# Patient Record
Sex: FEMALE | Race: WHITE | Employment: PART TIME | URBAN - METROPOLITAN AREA
[De-identification: names, ages, dates, MRNs, and addresses within clinical notes are randomized per-mention and may not be internally consistent; named-entity substitution may affect disease eponyms.]

---

## 2017-02-15 ENCOUNTER — OFFICE VISIT (OUTPATIENT)
Dept: FAMILY MEDICINE CLINIC | Age: 22
End: 2017-02-15

## 2017-02-15 VITALS
SYSTOLIC BLOOD PRESSURE: 108 MMHG | TEMPERATURE: 98.7 F | RESPIRATION RATE: 12 BRPM | WEIGHT: 117.4 LBS | DIASTOLIC BLOOD PRESSURE: 62 MMHG | HEART RATE: 72 BPM | OXYGEN SATURATION: 99 %

## 2017-02-15 DIAGNOSIS — L30.8 OTHER ECZEMA: ICD-10-CM

## 2017-02-15 DIAGNOSIS — J45.20 MILD INTERMITTENT ASTHMA WITHOUT COMPLICATION: Primary | ICD-10-CM

## 2017-02-15 DIAGNOSIS — L01.00 IMPETIGO: ICD-10-CM

## 2017-02-15 PROCEDURE — 99213 OFFICE O/P EST LOW 20 MIN: CPT | Performed by: FAMILY MEDICINE

## 2017-02-15 RX ORDER — ALBUTEROL SULFATE 90 UG/1
2 AEROSOL, METERED RESPIRATORY (INHALATION) EVERY 6 HOURS PRN
Qty: 1 INHALER | Refills: 1 | Status: SHIPPED | OUTPATIENT
Start: 2017-02-15

## 2017-02-15 RX ORDER — FLUTICASONE PROPIONATE 110 UG/1
1 AEROSOL, METERED RESPIRATORY (INHALATION) 2 TIMES DAILY
Qty: 1 INHALER | Refills: 1 | Status: CANCELLED | OUTPATIENT
Start: 2017-02-15 | End: 2018-02-15

## 2017-02-15 RX ORDER — MUPIROCIN CALCIUM 20 MG/G
CREAM TOPICAL
Qty: 1 TUBE | Refills: 0 | Status: SHIPPED | OUTPATIENT
Start: 2017-02-15 | End: 2017-03-17

## 2017-02-15 ASSESSMENT — ENCOUNTER SYMPTOMS
ABDOMINAL PAIN: 0
SHORTNESS OF BREATH: 0
COUGH: 0
WHEEZING: 0
CHEST TIGHTNESS: 0

## 2017-11-27 ENCOUNTER — HOSPITAL ENCOUNTER (OUTPATIENT)
Dept: OTHER | Age: 22
Discharge: HOME | End: 2017-11-27
Attending: FAMILY MEDICINE | Admitting: FAMILY MEDICINE

## 2017-11-27 ENCOUNTER — OFFICE VISIT (OUTPATIENT)
Dept: FAMILY MEDICINE CLINIC | Age: 22
End: 2017-11-27

## 2017-11-27 VITALS
WEIGHT: 114.4 LBS | SYSTOLIC BLOOD PRESSURE: 114 MMHG | DIASTOLIC BLOOD PRESSURE: 76 MMHG | HEART RATE: 71 BPM | TEMPERATURE: 99 F | OXYGEN SATURATION: 99 %

## 2017-11-27 DIAGNOSIS — S86.912A STRAIN OF LEFT KNEE, INITIAL ENCOUNTER: ICD-10-CM

## 2017-11-27 DIAGNOSIS — M25.562 ACUTE PAIN OF LEFT KNEE: Primary | ICD-10-CM

## 2017-11-27 DIAGNOSIS — M25.562 ACUTE PAIN OF LEFT KNEE: ICD-10-CM

## 2017-11-27 PROCEDURE — 99213 OFFICE O/P EST LOW 20 MIN: CPT | Performed by: FAMILY MEDICINE

## 2017-11-27 RX ORDER — NAPROXEN SODIUM 550 MG/1
550 TABLET ORAL 2 TIMES DAILY WITH MEALS
Qty: 30 TABLET | Refills: 0 | Status: SHIPPED | OUTPATIENT
Start: 2017-11-27 | End: 2020-03-20

## 2017-11-28 ENCOUNTER — TELEPHONE (OUTPATIENT)
Dept: FAMILY MEDICINE CLINIC | Age: 22
End: 2017-11-28

## 2017-11-28 NOTE — TELEPHONE ENCOUNTER
Spoke with pt advised per Dr Julianna Gutierrez of [place] knee xray and to follow instructions and f/u if sx persist

## 2017-11-28 NOTE — TELEPHONE ENCOUNTER
The patient called about knee xray results. Explained the result is in Dr. Rola Gardiner box ready for review. She is concerned as she still cannot straighten knee and has pain when walking.   Told her I will forward message and someone will call her when Dr. Rola Gardiner has had a chance to review

## 2017-12-13 ASSESSMENT — ENCOUNTER SYMPTOMS
SHORTNESS OF BREATH: 0
CHEST TIGHTNESS: 0
BLOOD IN STOOL: 0
COUGH: 0
ABDOMINAL PAIN: 0

## 2017-12-13 NOTE — PROGRESS NOTES
SUBJECTIVE:  Chris Kirkpatrick   1995   female   Allergies   Allergen Reactions    Milk-Related Compounds Shortness Of Breath    Other Anaphylaxis     trouble breathing and swelling    Lavender Oil        Chief Complaint   Patient presents with    Knee Pain     left knee pain x2 weeks pt states getting worse         Patient Active Problem List    Diagnosis Date Noted    Suicide attempt by multiple drug overdose ([facility] 08/11/2013    Overdose of antidepressant 08/11/2013    Asthma 08/11/2013       HPI   Pt is here today co 1-2 week hx of left knee pain. Reports she thinks she may have twisted it while walking. No swelling or redness. No previous problems with left knee. Pain can be worse with walking or fully flexing or extending the knee. No tx at home. Knee does not give way or lock. . No other concerns today. Past Medical History:   Diagnosis Date    Asthma     Bipolar disorder ([facility]     Depression      Social History     Social History    Marital status: Single     Spouse name: N/A    Number of children: N/A    Years of education: N/A     Occupational History    Not on file. Social History Main Topics    Smoking status: Never Smoker    Smokeless tobacco: Never Used    Alcohol use No    Drug use: No    Sexual activity: Not on file     Other Topics Concern    Not on file     Social History Narrative    ** Merged History Encounter **          Family History   Problem Relation Age of Onset    Cancer Mother     Heart Disease Maternal Grandmother     Cancer Maternal Grandmother        Review of Systems   Constitutional: Negative for activity change, appetite change and unexpected weight change. Respiratory: Negative for cough, chest tightness and shortness of breath. Cardiovascular: Negative for chest pain, palpitations and leg swelling. Gastrointestinal: Negative for abdominal pain and blood in stool. Endocrine: Negative for cold intolerance and heat intolerance.    Musculoskeletal: Negative for arthralgias and myalgias. Left knee pain   Skin: Negative for rash. Neurological: Negative for light-headedness and headaches. Hematological: Negative for adenopathy. Does not bruise/bleed easily. Psychiatric/Behavioral: Negative for dysphoric mood, sleep disturbance and suicidal ideas. The patient is not nervous/anxious. OBJECTIVE:  /76 (Site: Right Arm, Position: Sitting, Cuff Size: Medium Adult)   Pulse 71   Temp 99 °F (37.2 °C) (Oral)   Wt 114 lb 6.4 oz (51.9 kg)   LMP 11/21/2017   SpO2 99%   BMI 20.92 kg/m²   Physical Exam   Constitutional: She is oriented to person, place, and time. She appears well-developed and well-nourished. Eyes: EOM are normal. Pupils are equal, round, and reactive to light. Neck: Normal range of motion. Neck supple. Cardiovascular: Normal rate and regular rhythm. Pulmonary/Chest: Effort normal and breath sounds normal.   Abdominal: Soft. Bowel sounds are normal. There is no tenderness. Musculoskeletal:   Left knee-no swelling or redness of effusion. Nl rom with pain on full extension or flexion. No ligament laxity   Lymphadenopathy:     She has no cervical adenopathy. Neurological: She is alert and oriented to person, place, and time. She displays normal reflexes. No cranial nerve deficit. She exhibits normal muscle tone. Coordination normal.   Skin: No rash noted. Psychiatric: She has a normal mood and affect. Her behavior is normal.   Nursing note and vitals reviewed. ASSESSMENT/PLAN:    1. Acute pain of left knee    - XR KNEE LEFT (1-2 VIEWS); Future    2. Strain of left knee, initial encounter  Wrap, rest, ice  Anaprox DS BID with food  Re evaluate if no improvement in 2 weeks.         Orders Placed This Encounter   Procedures    XR KNEE LEFT (1-2 VIEWS)     Standing Status:   Future     Number of Occurrences:   1     Standing Expiration Date:   11/27/2018     Order Specific Question:   Reason for exam:     Answer:

## 2018-08-17 ENCOUNTER — OFFICE VISIT (OUTPATIENT)
Dept: FAMILY MEDICINE CLINIC | Age: 23
End: 2018-08-17

## 2018-08-17 VITALS
BODY MASS INDEX: 21.22 KG/M2 | WEIGHT: 116 LBS | OXYGEN SATURATION: 98 % | HEART RATE: 67 BPM | TEMPERATURE: 98.9 F | SYSTOLIC BLOOD PRESSURE: 110 MMHG | DIASTOLIC BLOOD PRESSURE: 68 MMHG
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
## 2018-08-17 DIAGNOSIS — S16.1XXA STRAIN OF NECK MUSCLE, INITIAL ENCOUNTER: ICD-10-CM

## 2018-08-17 DIAGNOSIS — M54.2 NECK PAIN: Primary | ICD-10-CM

## 2018-08-17 DIAGNOSIS — Z48.02 REMOVAL OF STAPLE: ICD-10-CM

## 2018-08-17 DIAGNOSIS — S01.01XA LACERATION OF SCALP, INITIAL ENCOUNTER: ICD-10-CM

## 2018-08-17 PROCEDURE — 99214 OFFICE O/P EST MOD 30 MIN: CPT | Performed by: FAMILY MEDICINE

## 2018-08-17 PROCEDURE — 1111F DSCHRG MED/CURRENT MED MERGE: CPT | Performed by: FAMILY MEDICINE

## 2018-08-17 ASSESSMENT — ENCOUNTER SYMPTOMS
CHEST TIGHTNESS: 0
ABDOMINAL PAIN: 0
COUGH: 0
SHORTNESS OF BREATH: 0
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
## 2018-08-17 NOTE — PATIENT INSTRUCTIONS
Patient Education        Neck Strain: Care Instructions  Your Care Instructions    You have strained the muscles and ligaments in your neck. A sudden, awkward movement can strain the neck. This often occurs with falls or car accidents or during certain sports. Everyday activities like working on a computer or sleeping can also cause neck strain if they force you to hold your neck in an awkward position for a long time. It is common for neck pain to get worse for a day or two after an injury, but it should start to feel better after that. You may have more pain and stiffness for several days before it gets better. This is expected. It may take a few weeks or longer for it to heal completely. Good home treatment can help you get better faster and avoid future neck problems. Follow-up care is a key part of your treatment and safety. Be sure to make and go to all appointments, and call your doctor if you are having problems. It's also a good idea to know your test results and keep a list of the medicines you take. How can you care for yourself at home? · If you were given a neck brace (cervical collar) to limit neck motion, wear it as instructed for as many days as your doctor tells you to. Do not wear it longer than you were told to. Wearing a brace for too long can make neck stiffness worse and weaken the neck muscles. · You can try using heat or ice to see if it helps. ¨ Try using a heating pad on a low or medium setting for 15 to 20 minutes every 2 to 3 hours. Try a warm shower in place of one session with the heating pad. You can also buy single-use heat wraps that last up to 8 hours. ¨ You can also try an ice pack for 10 to 15 minutes every 2 to 3 hours. · Take pain medicines exactly as directed. ¨ If the doctor gave you a prescription medicine for pain, take it as prescribed. ¨ If you are not taking a prescription pain medicine, ask your doctor if you can take an over-the-counter medicine.   · Gently rub

## 2019-06-18 ENCOUNTER — OFFICE VISIT (OUTPATIENT)
Dept: FAMILY MEDICINE CLINIC | Age: 24
End: 2019-06-18
Payer: COMMERCIAL

## 2019-06-18 VITALS
HEART RATE: 61 BPM | OXYGEN SATURATION: 98 % | BODY MASS INDEX: 20.19 KG/M2 | TEMPERATURE: 98.1 F | SYSTOLIC BLOOD PRESSURE: 108 MMHG | DIASTOLIC BLOOD PRESSURE: 66 MMHG | WEIGHT: 110.4 LBS

## 2019-06-18 DIAGNOSIS — R51.9 NONINTRACTABLE EPISODIC HEADACHE, UNSPECIFIED HEADACHE TYPE: ICD-10-CM

## 2019-06-18 DIAGNOSIS — S16.1XXA STRAIN OF NECK MUSCLE, INITIAL ENCOUNTER: ICD-10-CM
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
## 2019-06-18 DIAGNOSIS — M54.2 NECK PAIN: Primary | ICD-10-CM

## 2019-06-18 PROCEDURE — 99214 OFFICE O/P EST MOD 30 MIN: CPT | Performed by: FAMILY MEDICINE

## 2019-06-18 RX ORDER — AMITRIPTYLINE HYDROCHLORIDE 10 MG/1
10 TABLET, FILM COATED ORAL NIGHTLY
Qty: 30 TABLET | Refills: 0 | Status: SHIPPED | OUTPATIENT
Start: 2019-06-18 | End: 2019-08-27 | Stop reason: SDUPTHER

## 2019-06-24 ASSESSMENT — ENCOUNTER SYMPTOMS
SHORTNESS OF BREATH: 0
CHEST TIGHTNESS: 0
ABDOMINAL PAIN: 0
COUGH: 0
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
## 2019-07-17 ENCOUNTER — HOSPITAL ENCOUNTER (OUTPATIENT)
Dept: PHYSICAL THERAPY | Age: 24
Setting detail: THERAPIES SERIES
Discharge: HOME | End: 2019-07-17

## 2019-07-17 PROCEDURE — 97161 PT EVAL LOW COMPLEX 20 MIN: CPT

## 2019-07-17 PROCEDURE — 97530 THERAPEUTIC ACTIVITIES: CPT

## 2019-07-17 ASSESSMENT — PAIN DESCRIPTION - DESCRIPTORS: DESCRIPTORS: ACHING;TIGHTNESS

## 2019-07-17 ASSESSMENT — PAIN DESCRIPTION - PAIN TYPE: TYPE: ACUTE PAIN;CHRONIC PAIN

## 2019-07-17 ASSESSMENT — PAIN DESCRIPTION - LOCATION: LOCATION: NECK

## 2019-07-17 NOTE — FLOWSHEET NOTE
purpose of improving mobility and quad tone / recruitment which will allow for increased overall function including but not limited to self-care, transfers, ambulation, and ascending / descending stairs. Modalities:      Charges:  Timed Code Treatment Minutes: 23   Total Treatment Minutes: 38     [x] EVAL - LOW (32407)   [] EVAL - MOD (46980)  [] EVAL - HIGH (71983)  [] RE-EVAL (93291)  [] TE (61264) x      [] Ionto  [] NMR (75680) x      [] Vaso  [] Manual (01672) x      [] Ultrasound  [x] TA x 2      [] Mech Traction (94603)  [] Gait Training x     [] ES (un) (17095):   [] Aquatic therapy x   [] Other:   [] Group:     GOALS:   Long term goals  Time Frame for Long term goals : 10 weeks  Long term goal 1: Pt will demo 5/5 strength in B UE for improved ADLs. Long term goal 2: Pt will report pain decrease to 2/10 at worst for improved activity tolerance. Long term goal 3: Pt will return to working full duty. Long term goal 4: Pt will be report scar is less sensitive to touch and that she is able to sleep on her back without scar discomfort. Long term goal 5: Pt will be independent with HEP for improved long term health. Progression Towards Functional goals:  [] Patient is progressing as expected towards functional goals listed. [] Progression is slowed due to complexities listed. [] Progression has been slowed due to co-morbidities.   [x] Plan just implemented, too soon to assess goals progression  [] Other:     Persisting Functional Limitations/Impairments:  [x]Sleeping []Sitting               []Standing []Transfers        []Walking []Kneeling               []Stairs []Squatting / bending   []ADLs []Reaching  [x]Lifting  [x]Housework  []Driving [x]Job related tasks  [x]Sports/Recreation []Other:        ASSESSMENT:  See eval  Treatment/Activity Tolerance:  [x] Patient tolerated treatment well [] Patient limited by fatigue  [] Patient limited by pain  [] Patient limited by other medical complications  [] Other:     Prognosis: [x] Good [] Fair  [] Poor    Patient Requires Follow-up: [x] Yes  [] No    PLAN: See eval. PT 1x / week for 10 weeks.    [] Continue per plan of care [] Alter current plan (see comments)  [x] Plan of care initiated [] Hold pending MD visit [] Discharge    Electronically signed by: Derek Alvares PT, DPT
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
## 2019-08-27 ENCOUNTER — OFFICE VISIT (OUTPATIENT)
Dept: FAMILY MEDICINE CLINIC | Age: 24
End: 2019-08-27
Payer: COMMERCIAL

## 2019-08-27 VITALS
OXYGEN SATURATION: 98 % | TEMPERATURE: 98.5 F | RESPIRATION RATE: 16 BRPM | SYSTOLIC BLOOD PRESSURE: 118 MMHG | DIASTOLIC BLOOD PRESSURE: 70 MMHG | BODY MASS INDEX: 20.56 KG/M2 | HEART RATE: 61 BPM | WEIGHT: 112.4 LBS

## 2019-08-27 DIAGNOSIS — S16.1XXA STRAIN OF NECK MUSCLE, INITIAL ENCOUNTER: Primary | ICD-10-CM
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
## 2019-08-27 DIAGNOSIS — G44.329 CHRONIC POST-TRAUMATIC HEADACHE, NOT INTRACTABLE: ICD-10-CM

## 2019-08-27 DIAGNOSIS — M54.2 NECK PAIN: ICD-10-CM

## 2019-08-27 PROCEDURE — 99213 OFFICE O/P EST LOW 20 MIN: CPT | Performed by: FAMILY MEDICINE

## 2019-08-27 RX ORDER — AMITRIPTYLINE HYDROCHLORIDE 25 MG/1
25 TABLET, FILM COATED ORAL NIGHTLY
Qty: 30 TABLET | Refills: 1 | Status: SHIPPED | OUTPATIENT
Start: 2019-08-27 | End: 2020-03-20

## 2019-09-04 ENCOUNTER — HOSPITAL ENCOUNTER (EMERGENCY)
Age: 24
Discharge: HOME | End: 2019-09-04
Attending: EMERGENCY MEDICINE
Payer: COMMERCIAL

## 2019-09-04 VITALS
HEIGHT: 60 IN | SYSTOLIC BLOOD PRESSURE: 106 MMHG | WEIGHT: 110 LBS | DIASTOLIC BLOOD PRESSURE: 64 MMHG | BODY MASS INDEX: 21.6 KG/M2 | OXYGEN SATURATION: 99 % | TEMPERATURE: 98.3 F | HEART RATE: 65 BPM | RESPIRATION RATE: 12 BRPM

## 2019-09-04 DIAGNOSIS — R20.2 PARESTHESIA: Primary | ICD-10-CM

## 2019-09-04 LAB
A/G RATIO: 1.6 (ref 1.1–2.2)
ALBUMIN SERPL-MCNC: 4.4 G/DL (ref 3.4–5)
ALP BLD-CCNC: 46 U/L (ref 40–129)
ALT SERPL-CCNC: 7 U/L (ref 10–40)
AMYLASE: 65 U/L (ref 25–115)
ANION GAP SERPL CALCULATED.3IONS-SCNC: 10 MMOL/L (ref 3–16)
AST SERPL-CCNC: 15 U/L (ref 15–37)
BILIRUB SERPL-MCNC: 0.4 MG/DL (ref 0–1)
BUN BLDV-MCNC: 11 MG/DL (ref 7–20)
CALCIUM SERPL-MCNC: 9.3 MG/DL (ref 8.3–10.6)
CHLORIDE BLD-SCNC: 103 MMOL/L (ref 99–110)
CO2: 26 MMOL/L (ref 21–32)
CREAT SERPL-MCNC: 0.6 MG/DL (ref 0.6–1.1)
GFR AFRICAN AMERICAN: >60
GFR NON-AFRICAN AMERICAN: >60
GLOBULIN: 2.7 G/DL
GLUCOSE BLD-MCNC: 88 MG/DL (ref 70–99)
HCG QUALITATIVE: NEGATIVE
HCT VFR BLD CALC: 39.4 % (ref 36–48)
HEMOGLOBIN: 13.2 G/DL (ref 12–16)
LIPASE: 26 U/L (ref 13–60)
MAGNESIUM: 2.2 MG/DL (ref 1.8–2.4)
MCH RBC QN AUTO: 34 PG (ref 26–34)
MCHC RBC AUTO-ENTMCNC: 33.4 G/DL (ref 31–36)
MCV RBC AUTO: 101.8 FL (ref 80–100)
PDW BLD-RTO: 12.6 % (ref 12.4–15.4)
PLATELET # BLD: 272 K/UL (ref 135–450)
PMV BLD AUTO: 7.8 FL (ref 5–10.5)
POTASSIUM SERPL-SCNC: 3.8 MMOL/L (ref 3.5–5.1)
RBC # BLD: 3.87 M/UL (ref 4–5.2)
SODIUM BLD-SCNC: 139 MMOL/L (ref 136–145)
TOTAL PROTEIN: 7.1 G/DL (ref 6.4–8.2)
WBC # BLD: 5.9 K/UL (ref 4–11)

## 2019-09-04 PROCEDURE — 99283 EMERGENCY DEPT VISIT LOW MDM: CPT

## 2019-09-04 PROCEDURE — 85027 COMPLETE CBC AUTOMATED: CPT

## 2019-09-04 PROCEDURE — 83690 ASSAY OF LIPASE: CPT

## 2019-09-04 PROCEDURE — 84703 CHORIONIC GONADOTROPIN ASSAY: CPT

## 2019-09-04 PROCEDURE — 80053 COMPREHEN METABOLIC PANEL: CPT

## 2019-09-04 PROCEDURE — 83735 ASSAY OF MAGNESIUM: CPT

## 2019-09-04 PROCEDURE — 6370000000 HC RX 637 (ALT 250 FOR IP): Performed by: EMERGENCY MEDICINE

## 2019-09-04 PROCEDURE — 82150 ASSAY OF AMYLASE: CPT

## 2019-09-04 RX ORDER — GABAPENTIN 100 MG/1
100 CAPSULE ORAL NIGHTLY
Qty: 30 CAPSULE | Refills: 0 | Status: SHIPPED | OUTPATIENT
Start: 2019-09-04 | End: 2020-03-20

## 2019-09-04 RX ORDER — GABAPENTIN 300 MG/1
300 CAPSULE ORAL ONCE
Status: COMPLETED | OUTPATIENT
Start: 2019-09-04 | End: 2019-09-04

## 2019-09-04 RX ADMIN — GABAPENTIN 300 MG: 300 CAPSULE ORAL at 19:19

## 2019-09-04 ASSESSMENT — ENCOUNTER SYMPTOMS
COUGH: 0
ABDOMINAL PAIN: 0
SHORTNESS OF BREATH: 0

## 2019-09-04 NOTE — ED PROVIDER NOTES
MG capsule Take 1 capsule by mouth nightly for 30 days. , Disp-30 capsule, R-0Print             DISCONTINUED MEDICATIONS:  Discharge Medication List as of 9/4/2019  8:09 PM                 (Please note that portions ofthis note were completed with a voice recognition program.  Efforts were made to edit the dictations but occasionally words are mis-transcribed.)    David Stephens MD (electronically signed)         David Stephens MD  09/04/19 9483

## 2019-09-10 ENCOUNTER — TELEPHONE (OUTPATIENT)
Dept: FAMILY MEDICINE CLINIC | Age: 24
End: 2019-09-10

## 2019-10-01 ENCOUNTER — HOSPITAL ENCOUNTER (OUTPATIENT)
Dept: PHYSICAL THERAPY | Age: 24
Setting detail: THERAPIES SERIES
Discharge: HOME | End: 2019-10-01
Payer: COMMERCIAL

## 2019-10-01 PROCEDURE — 97140 MANUAL THERAPY 1/> REGIONS: CPT

## 2019-10-01 PROCEDURE — 97164 PT RE-EVAL EST PLAN CARE: CPT

## 2019-10-01 PROCEDURE — 97110 THERAPEUTIC EXERCISES: CPT

## 2019-10-11 ENCOUNTER — HOSPITAL ENCOUNTER (OUTPATIENT)
Dept: PHYSICAL THERAPY | Age: 24
Setting detail: THERAPIES SERIES
Discharge: HOME | End: 2019-10-11
Payer: COMMERCIAL

## 2019-10-11 PROCEDURE — 97110 THERAPEUTIC EXERCISES: CPT

## 2019-10-11 PROCEDURE — 97140 MANUAL THERAPY 1/> REGIONS: CPT

## 2019-10-14 ENCOUNTER — HOSPITAL ENCOUNTER (OUTPATIENT)
Dept: PHYSICAL THERAPY | Age: 24
Setting detail: THERAPIES SERIES
Discharge: HOME | End: 2019-10-14
Payer: COMMERCIAL

## 2019-10-14 PROCEDURE — 97530 THERAPEUTIC ACTIVITIES: CPT

## 2019-10-14 PROCEDURE — 97110 THERAPEUTIC EXERCISES: CPT

## 2019-10-16 ENCOUNTER — APPOINTMENT (OUTPATIENT)
Dept: PHYSICAL THERAPY | Age: 24
End: 2019-10-16
Payer: COMMERCIAL

## 2019-10-23 ENCOUNTER — HOSPITAL ENCOUNTER (OUTPATIENT)
Dept: PHYSICAL THERAPY | Age: 24
Setting detail: THERAPIES SERIES
Discharge: HOME | End: 2019-10-23
Payer: COMMERCIAL

## 2019-10-23 PROCEDURE — 97112 NEUROMUSCULAR REEDUCATION: CPT

## 2019-10-23 PROCEDURE — 97110 THERAPEUTIC EXERCISES: CPT

## 2019-10-28 ENCOUNTER — HOSPITAL ENCOUNTER (OUTPATIENT)
Dept: PHYSICAL THERAPY | Age: 24
Setting detail: THERAPIES SERIES
Discharge: HOME | End: 2019-10-28
Payer: COMMERCIAL

## 2019-10-28 PROCEDURE — 97140 MANUAL THERAPY 1/> REGIONS: CPT

## 2019-10-28 PROCEDURE — 97110 THERAPEUTIC EXERCISES: CPT

## 2019-10-28 PROCEDURE — 97112 NEUROMUSCULAR REEDUCATION: CPT

## 2019-10-30 ENCOUNTER — HOSPITAL ENCOUNTER (OUTPATIENT)
Dept: PHYSICAL THERAPY | Age: 24
Setting detail: THERAPIES SERIES
Discharge: HOME | End: 2019-10-30
Payer: COMMERCIAL

## 2019-11-04 ENCOUNTER — HOSPITAL ENCOUNTER (OUTPATIENT)
Dept: PHYSICAL THERAPY | Age: 24
Setting detail: THERAPIES SERIES
Discharge: HOME | End: 2019-11-04
Payer: COMMERCIAL

## 2019-11-04 PROCEDURE — 97140 MANUAL THERAPY 1/> REGIONS: CPT

## 2019-11-04 PROCEDURE — 97110 THERAPEUTIC EXERCISES: CPT

## 2019-11-06 ENCOUNTER — HOSPITAL ENCOUNTER (OUTPATIENT)
Dept: PHYSICAL THERAPY | Age: 24
Setting detail: THERAPIES SERIES
Discharge: HOME | End: 2019-11-06
Payer: COMMERCIAL

## 2019-11-06 PROCEDURE — 97110 THERAPEUTIC EXERCISES: CPT

## 2019-11-06 PROCEDURE — 97140 MANUAL THERAPY 1/> REGIONS: CPT

## 2019-11-11 ENCOUNTER — HOSPITAL ENCOUNTER (OUTPATIENT)
Dept: PHYSICAL THERAPY | Age: 24
Setting detail: THERAPIES SERIES
Discharge: HOME | End: 2019-11-11
Payer: COMMERCIAL

## 2019-11-11 PROCEDURE — 97530 THERAPEUTIC ACTIVITIES: CPT

## 2019-11-11 PROCEDURE — 97110 THERAPEUTIC EXERCISES: CPT

## 2019-11-11 PROCEDURE — 97140 MANUAL THERAPY 1/> REGIONS: CPT

## 2019-11-12 ENCOUNTER — HOSPITAL ENCOUNTER (OUTPATIENT)
Dept: PHYSICAL THERAPY | Age: 24
Setting detail: THERAPIES SERIES
Discharge: HOME | End: 2019-11-12
Payer: COMMERCIAL

## 2019-11-14 ENCOUNTER — HOSPITAL ENCOUNTER (OUTPATIENT)
Dept: PHYSICAL THERAPY | Age: 24
Setting detail: THERAPIES SERIES
Discharge: HOME | End: 2019-11-14
Payer: COMMERCIAL

## 2019-11-14 PROCEDURE — 97110 THERAPEUTIC EXERCISES: CPT

## 2019-11-14 PROCEDURE — 97140 MANUAL THERAPY 1/> REGIONS: CPT

## 2019-11-19 ENCOUNTER — HOSPITAL ENCOUNTER (OUTPATIENT)
Dept: PHYSICAL THERAPY | Age: 24
Setting detail: THERAPIES SERIES
Discharge: HOME | End: 2019-11-19
Payer: COMMERCIAL

## 2019-11-19 PROCEDURE — 97140 MANUAL THERAPY 1/> REGIONS: CPT

## 2019-11-19 PROCEDURE — 97110 THERAPEUTIC EXERCISES: CPT

## 2019-11-21 ENCOUNTER — TELEPHONE (OUTPATIENT)
Dept: FAMILY MEDICINE CLINIC | Age: 24
End: 2019-11-21

## 2019-11-21 ENCOUNTER — HOSPITAL ENCOUNTER (OUTPATIENT)
Dept: PHYSICAL THERAPY | Age: 24
Setting detail: THERAPIES SERIES
Discharge: HOME | End: 2019-11-21
Payer: COMMERCIAL

## 2019-11-29 ENCOUNTER — HOSPITAL ENCOUNTER (OUTPATIENT)
Dept: PHYSICAL THERAPY | Age: 24
Setting detail: THERAPIES SERIES
Discharge: HOME | End: 2019-11-29
Payer: COMMERCIAL

## 2019-12-03 ENCOUNTER — HOSPITAL ENCOUNTER (OUTPATIENT)
Dept: PHYSICAL THERAPY | Age: 24
Setting detail: THERAPIES SERIES
Discharge: HOME | End: 2019-12-03
Payer: COMMERCIAL

## 2020-01-07 ENCOUNTER — HOSPITAL ENCOUNTER (OUTPATIENT)
Dept: PHYSICAL THERAPY | Age: 25
Setting detail: THERAPIES SERIES
Discharge: HOME | End: 2020-01-07
Payer: COMMERCIAL

## 2020-01-07 PROCEDURE — 97140 MANUAL THERAPY 1/> REGIONS: CPT

## 2020-01-07 PROCEDURE — 97110 THERAPEUTIC EXERCISES: CPT

## 2020-01-07 NOTE — FLOWSHEET NOTE
[facility] - Outpatient Physical Therapy      Physical Therapy Daily Treatment Note  Date:  2020    Patient Name:  Edouard Cespedes    :  1995  MRN: [id number]  Restrictions/Precautions:  Medical/Treatment Diagnosis Information:  · Diagnosis: M54.2 (ICD-10-CM) - Cervicalgia  Treatment Diagnosis: hypersensitivity along scar, decreased strength in the R UE, decreased  strength on R, positive spurling's, malalignment in cerv spine  Insurance/Certification information:  PT Insurance Information: [facility]- no precert, 31% co-insurance. only allows one 98646 (traction) code per day. 30 PT/OT/ST per year. Physician Information:  Referring Practitioner: Kiesha Maxwell MD  Plan of care signed (Y/N):Y  Date of Patient follow up with Physician:     Progress Note: []  Yes  [x]  No  Next due by: Visit #19       Latex Allergy:  [x]NO      []YES  Preferred Language for Healthcare:   [x]English       []other:    Visit # Insurance Allowable Date Range (if applicable)   + [phone] (PT,OT,ST)  10 visits  used   2020    N/A     Pain level:  5-6/10     SUBJECTIVE:   Patient states she has had trouble fixing her posture with \"burning\" in her ears and front of her shoulders. Still has HAS intermittently t/o the day,, every day. sasleeping last night with lying on her R shoulder because it was burning.     OBJECTIVE:   :   Observation/Palpation:  1/7Depressed L shoulder girdle to T3; elevated R shoulder girdle to T1; B SCM tightness    Therapeutic Exercises  Resistance / level Sets/sec Reps Notes   UBE  2.5  mins forward and retro ; 5'     Pulleys  Flexion X1', Scaption X1     Ball on the wall       PREs       Chin tucks wall Chin tuck neutral (CT)  CT with:  B Scapular retraction     B GH flexion to 90 degrees'    B GH ER     B GH Horizontal AB /ADD (pain free ROM)      AAROM ceB GH flexion full AROM 0# x10 each  Added with AAROM cervical flexion on     Added [address] movements  scap retract   x10    tbands-  Mid rows  LPD  B ER  [address] [place] 2  1  x10  x10        Back on wall for TC   Push up PLUS   x10            Prone    Prone Cervical extension isos   5\" 8    Neuromuscular Re-ed / Therapeutic Activities       Counter to shelf       rebounder       1/2 foam roll with CT and scap retract  1/2 foam roll along spine with B GHJ abd and CT     Foam roll horizontally on wall - self mobs and thor ext       90/90 toss and catch at wall     Wall Slides     Overhead Lower trapezius lift offs     Facing away from wall Lower trapezius arm slides     REverse W's     Manual Intervention             X3   X13'    Cervical unloading/traction  X 4 mins   Cervical mobs PA C6-7   sideglides R C3, C5-7 GR III 13'    R 1st rib mob GR III/IV  5'    Thoracic PA's T1-4GR III  8'    Manual/MET  See below                Pt. Education:  10/1: Reassessed goals, discussed progress made and areas remaining for improvement, issued outcome measure and advised on using ice and desensitization techniques at home on scar, issued tennis balls in sock for home craniocradle    -patient educated on diagnosis, prognosis and expectations for rehab  -all patient questions were answered    Manual:  1/7: TOS release, MET to correct C7 ERSR, C7 NRRSL/FRSR, C2 FRSR, C3 ERSR, R platysmus release, SOR X15'  11/19:Prone PA glides T2-T8 Grade III, R TP PA glides T4-T7 and corresponding ribs as well as L TP PA glides to T3 and L 3rd rib X8'  11/11:MET to correct C2 FRSR, C4 NR:SR/ERSL, C3/C5 FRSL, T1 NRRSL/ERSR, T2 FRSR, R 1st rib inferior glides followed with R SCM stretches 30\" X3, L STM/MFR platysmus, Prone TP PA manipulations C2-T2 Grade II; R GH nferior Joint Mobs Grades III-IV, Anterior-Posterior Joint Mobs Grades III-IV, Shoulder PROM all planes x 25'  11/6: MET to correct T1 ERSR, T2 NRRSL/FRSR, T3 ERSL, T4 FRSL, R 1st rib inferior glides followed with R SCM stretches 30\" X3, L STM/MFR platysmus, Prone TP PA manipulations T5-T8    HEP instruction:  10/1: added desensitization for scar and SOR with tennis balls in sock   -patient provided with written and illustrated instructions for HEP (see scanned image in )    Therapeutic Exercise and NMR EXR  [x] (34395) Provided verbal/tactile cueing for activities related to strengthening, flexibility, endurance, ROM for improvements in  [] LE / Lumbar: LE, proximal hip, and core control with self care, mobility, lifting, ambulation. [x] UE / Cervical: cervical, postural, scapular, scapulothoracic and UE control with self care, reaching, carrying, lifting, house/yardwork, driving, computer work.  [] (05137) Provided verbal/tactile cueing for activities related to improving balance, coordination, kinesthetic sense, posture, motor skill, proprioception to assist with   [] LE / lumbar: LE, proximal hip, and core control in self care, mobility, lifting, ambulation and eccentric single leg control. [] UE / cervical: cervical, scapular, scapulothoracic and UE control with self care, reaching, carrying, lifting, house/yardwork, driving, computer work.   [] (63346) Therapist is in constant attendance of 2 or more patients providing skilled therapy interventions, but not providing any significant amount of measurable one-on-one time to either patient, for improvements in  [] LE / lumbar: LE, proximal hip, and core control in self care, mobility, lifting, ambulation and eccentric single leg control. [] UE / cervical: cervical, scapular, scapulothoracic and UE control with self care, reaching, carrying, lifting, house/yardwork, driving, computer work.      NMR and Therapeutic Activities:    [x] (27987 or 25827) Provided verbal/tactile cueing for activities related to improving balance, coordination, kinesthetic sense, posture, motor skill, proprioception and motor activation to allow for proper function of   [] LE: / Lumbar core, proximal hip and LE with self care and ADLs  [x] UE / Cervical: cervical, postural, scapular, scapulothoracic and UE control with self care, carrying, lifting, driving, computer work.   [] (61069) Gait Re-education- Provided training and instruction to the patient for proper LE, core and proximal hip recruitment and positioning and eccentric body weight control with ambulation re-education including up and down stairs     Home Management Training / Self Care:  [] (39038) Provided self-care/home management training related to activities of daily living and compensatory training, and/or use of adaptive equipment for improvement with: ADLs and compensatory training, meal preparation, safety procedures and instruction in use of adaptive equipment, including bathing, grooming, dressing, personal hygiene, basic household cleaning and chores.      Home Exercise Program:  (22870) Reviewed/Progressed HEP activities related to strengthening, flexibility, endurance, ROM of   [] LE / Lumbar: core, proximal hip and LE for functional self-care, mobility, lifting and ambulation/stair navigation   [] UE / Cervical: cervical, postural, scapular, scapulothoracic and UE control with self care, reaching, carrying, lifting, house/yardwork, driving, computer work  [] (72386)Reviewed/Progressed HEP activities related to improving balance, coordination, kinesthetic sense, posture, motor skill, proprioception of   [] LE: core, proximal hip and LE for self care, mobility, lifting, and ambulation/stair navigation    [] UE / Cervical: cervical, postural,  scapular, scapulothoracic and UE control with self care, reaching, carrying, lifting, house/yardwork, driving, computer work    Manual Treatments:  PROM / STM / Oscillations-Mobs:  G-I, II, III, IV (PA's, Inf., Post.)  [x] (37603) Provided manual therapy to mobilize LE, proximal hip and/or LS spine soft tissue/joints for the purpose of modulating pain, promoting relaxation,  increasing ROM, reducing/eliminating soft tissue listed. [] Progression has been slowed due to co-morbidities. [] Plan just implemented, too soon to assess goals progression  [] Other:     Persisting Functional Limitations/Impairments:  [x]Sleeping []Sitting               []Standing []Transfers        []Walking []Kneeling               []Stairs []Squatting / bending   []ADLs []Reaching  [x]Lifting  [x]Housework  []Driving [x]Job related tasks  [x]Sports/Recreation []Other:        ASSESSMENT:  Pt presents with decreasing  posture in cervical regon , and  still has  forward flexed trunk with thoracic kyphosis and mild R Rotation. .  Per PT findings, pt demonstrates moderate thoracic hypomobility and R side cervical tightness with improved mobility post manual therapy this date but no change in pain. Pt demonstrates weakness in periscapular muscles with wall pushups with tactile and verbal cueing needed for proper technique. Treatment/Activity Tolerance:  [x] Patient tolerated treatment well [] Patient limited by fatigue  [] Patient limited by pain  [] Patient limited by other medical complications  [x] Other: improved motion of high c spine - still stuck in L rot but increased movement felt, advised pt to continue with stretching and posture focus    Prognosis: [x] Good [] Fair  [] Poor    Patient Requires Follow-up: [x] Yes  [] No    PLAN: See eval. PT 1x / week for 10 weeks.    [x] Continue per plan of care [] Alter current plan (see comments)  [] Plan of care initiated [] Hold pending MD visit [] Discharge    Electronically signed by:     Kain Newton PT #[id number]  \

## 2020-01-09 ENCOUNTER — HOSPITAL ENCOUNTER (OUTPATIENT)
Dept: PHYSICAL THERAPY | Age: 25
Setting detail: THERAPIES SERIES
Discharge: HOME | End: 2020-01-09
Payer: COMMERCIAL

## 2020-01-09 PROCEDURE — 97112 NEUROMUSCULAR REEDUCATION: CPT

## 2020-01-09 PROCEDURE — 97140 MANUAL THERAPY 1/> REGIONS: CPT

## 2020-01-09 PROCEDURE — 97110 THERAPEUTIC EXERCISES: CPT

## 2020-01-09 NOTE — FLOWSHEET NOTE
to shelf       rebounder       1/2 foam roll with CT and scap retract  1/2 foam roll along spine with B GHJ abd and CT     Foam roll horizontally on wall - self mobs and thor ext       90/90 toss and catch at wall     Wall Slides     Overhead Lower trapezius lift offs     Facing away from wall Lower trapezius arm slides     Prone B GH ext 0# 2 10 1/9 for scapular NMR comtrol   Prone rows 0# 2 10 1/9 for scapular NMR comtrol   Prone B Mid rows with elbow flexed 0# isos  0# AROM  1  110  101/9 for scapular NMR comtrol   Prone B Lower trapezius rows with elbow flexed 0# isos  0# AROM  1  110  101/9 for scapular NMR comtrol   Prone cervical extension isometrics  110 1/9 for scapular NMR comtrol   REverse W's     Manual Intervention             X3   X13'    Cervical unloading/traction  X 4 mins   Cervical mobs PA C6-7   sideglides R C3, C5-7 GR III 13'    R 1st rib mob GR III/IV  5'    Thoracic PA's T1-4GR III  8'    Manual/MET  See below                Pt. Education:  10/1: Reassessed goals, discussed progress made and areas remaining for improvement, issued outcome measure and advised on using ice and desensitization techniques at home on scar, issued tennis balls in sock for home craniocradle    -patient educated on diagnosis, prognosis and expectations for rehab  -all patient questions were answered    Manual:  1/9: T7/T Manipulation , T2-T4 R TP PA glides and corresponding ribs, MET to correct C7 FRSR, T1 ERSL, L 1st rib respiratory inferior glides followed with L SCM stretches 30\" X4 X12'  1/7: TOS release, MET to correct C7 ERSR, C7 NRRSL/FRSR, C2 FRSR, C3 ERSR, R platysmus release, SOR X15'  11/19:Prone PA glides T2-T8 Grade III, R TP PA glides T4-T7 and corresponding ribs as well as L TP PA glides to T3 and L 3rd rib X8'  11/11:MET to correct C2 FRSR, C4 NR:SR/ERSL, C3/C5 FRSL, T1 NRRSL/ERSR, T2 FRSR, R 1st rib inferior glides followed with R SCM stretches 30\" X3, L STM/MFR platysmus, Prone TP PA manipulations C2-T2 Grade II; R GH nferior Joint Mobs Grades III-IV, Anterior-Posterior Joint Mobs Grades III-IV, Shoulder PROM all planes x 25'  11/6: MET to correct T1 ERSR, T2 NRRSL/FRSR, T3 ERSL, T4 FRSL, R 1st rib inferior glides followed with R SCM stretches 30\" X3, L STM/MFR platysmus, Prone TP PA manipulations T5-T8    HEP instruction:  10/1: added desensitization for scar and SOR with tennis balls in sock   -patient provided with written and illustrated instructions for HEP (see scanned image in )    Therapeutic Exercise and NMR EXR  [x] (88329) Provided verbal/tactile cueing for activities related to strengthening, flexibility, endurance, ROM for improvements in  [] LE / Lumbar: LE, proximal hip, and core control with self care, mobility, lifting, ambulation. [x] UE / Cervical: cervical, postural, scapular, scapulothoracic and UE control with self care, reaching, carrying, lifting, house/yardwork, driving, computer work.  [] (32276) Provided verbal/tactile cueing for activities related to improving balance, coordination, kinesthetic sense, posture, motor skill, proprioception to assist with   [] LE / lumbar: LE, proximal hip, and core control in self care, mobility, lifting, ambulation and eccentric single leg control. [] UE / cervical: cervical, scapular, scapulothoracic and UE control with self care, reaching, carrying, lifting, house/yardwork, driving, computer work.   [] (95285) Therapist is in constant attendance of 2 or more patients providing skilled therapy interventions, but not providing any significant amount of measurable one-on-one time to either patient, for improvements in  [] LE / lumbar: LE, proximal hip, and core control in self care, mobility, lifting, ambulation and eccentric single leg control. [] UE / cervical: cervical, scapular, scapulothoracic and UE control with self care, reaching, carrying, lifting, house/yardwork, driving, computer work.      NMR and Therapeutic Activities: PROM / STM / Oscillations-Mobs:  G-I, II, III, IV (PA's, Inf., Post.)  [x] (41597) Provided manual therapy to mobilize LE, proximal hip and/or LS spine soft tissue/joints for the purpose of modulating pain, promoting relaxation,  increasing ROM, reducing/eliminating soft tissue swelling/inflammation/restriction, improving soft tissue extensibility and allowing for proper ROM for normal function with   [] LE / lumbar: self care, mobility, lifting and ambulation. [x] UE / Cervical: self care, reaching, carrying, lifting, house/yardwork, driving, computer work. Modalities:  [x] (49740) Vasopneumatic compression: Utilized vasopneumatic compression to decrease edema / swelling for the purpose of improving mobility and quad tone / recruitment which will allow for increased overall function including but not limited to self-care, transfers, ambulation, and ascending / descending stairs. Modalities: 1/8, 11/19, 11/11,  11/6, 11/4, 10/28,10/23: MHP to cerv spine in supine w/ bolster under the knees x10 min  10/14, 10/11: MHP to cerv psine in sitting x 10 mins     Charges:  Timed Code Treatment Minutes: 30   Total Treatment Minutes: 40     [] EVAL - LOW (50497)   [] EVAL - MOD (39839)  [] EVAL - HIGH (25378)  [] RE-EVAL (97571)  [] TE (60097) x   1   [] Ionto  [x] NMR (05303) x   1   [] Vaso  [x] Manual (90822) x1    [] Ultrasound  [] TA x       [] Mech Traction (34220)  [] Gait Training x     [] ES (un) (90446):   [] Aquatic therapy x   [] Other:   [] Group:     GOALS:   Long term goals  Time Frame for Long term goals : 10 weeks  Long term goal 1: Pt will demo 5/5 strength in B UE for improved ADLs. Progressing   Long term goal 2: Pt will report pain decrease to 2/10 at worst for improved activity tolerance. Goal not met  Long term goal 3: Pt will return to working full duty.   Goal not met  Long term goal 4: Pt will be report scar is less sensitive to touch and that she is able to sleep on her back without scar discomfort. Progressing  Long term goal 5: Pt will be independent with HEP for improved long term health. ongoing    Progression Towards Functional goals:  [] Patient is progressing as expected towards functional goals listed. [x] Progression is slowed due to complexities listed. [] Progression has been slowed due to co-morbidities. [] Plan just implemented, too soon to assess goals progression  [] Other:     Persisting Functional Limitations/Impairments:  [x]Sleeping []Sitting               []Standing []Transfers        []Walking []Kneeling               []Stairs []Squatting / bending   []ADLs []Reaching  [x]Lifting  [x]Housework  []Driving [x]Job related tasks  [x]Sports/Recreation []Other:        ASSESSMENT:  Pt presents with  moderate thoracic hypomobility, C-T junction stiffness, and elevated ribs with scapular/trunk weakness. Patient will likely benefit from skilled PT to address her  impairments and functional limitations to restore her to her PLOF. Treatment/Activity Tolerance:  [x] Patient tolerated treatment well [] Patient limited by fatigue  [] Patient limited by pain  [] Patient limited by other medical complications  [x] Other: improved motion of high c spine - still stuck in L rot but increased movement felt, advised pt to continue with stretching and posture focus    Prognosis: [x] Good [] Fair  [] Poor    Patient Requires Follow-up: [x] Yes  [] No    PLAN: See pipe. PT 1x / week for 10 weeks.    [x] Continue per plan of care [] Alter current plan (see comments)  [] Plan of care initiated [] Hold pending MD visit [] Discharge    Electronically signed by:     Park Gray PT #[id number]

## 2020-01-15 ENCOUNTER — HOSPITAL ENCOUNTER (OUTPATIENT)
Dept: PHYSICAL THERAPY | Age: 25
Setting detail: THERAPIES SERIES
Discharge: HOME | End: 2020-01-15
Payer: COMMERCIAL

## 2020-01-15 PROCEDURE — 97110 THERAPEUTIC EXERCISES: CPT

## 2020-01-15 PROCEDURE — 97140 MANUAL THERAPY 1/> REGIONS: CPT

## 2020-01-15 NOTE — FLOWSHEET NOTE
[facility] - Outpatient Physical Therapy      Physical Therapy Daily Treatment Note  Date:  1/15/2020    Patient Name:  Chayito Agarwal    :  1995  MRN: [id number]  Restrictions/Precautions:  Medical/Treatment Diagnosis Information:  · Diagnosis: M54.2 (ICD-10-CM) - Cervicalgia  Treatment Diagnosis: hypersensitivity along scar, decreased strength in the R UE, decreased  strength on R, positive spurling's, malalignment in cerv spine  Insurance/Certification information:  PT Insurance Information: precious [facility]- no precert, 33% co-insurance. only allows one 42439 (traction) code per day. 30 PT/OT/ST per year. Physician Information:  Referring Practitioner: Nato Henry MD  Plan of care signed (Y/N):Y  Date of Patient follow up with Physician:     Progress Note: []  Yes  [x]  No  Next due by: Visit #19       Latex Allergy:  [x]NO      []YES  Preferred Language for Healthcare:   [x]English       []other:    Visit # Insurance Allowable Date Range (if applicable)   + 187-35 30 (PT,OT,ST)  10 visits  used   2020    N/A     Pain level:  5-6/10     SUBJECTIVE:   Patient states she was sore after her last PT session because she had not been in PT for a while. She did some report  \"burning\" in her ears and front of her shoulders. States she felt some numbness in her feet yesterday as well.   OBJECTIVE:   :   Observation/Palpation:  :Depressed L shoulder girdle to T3; elevated R shoulder girdle to T1; B SCM tightness    Therapeutic Exercises  Resistance / level Sets/sec Reps Notes   UBE  2.0  mins forward and retro ; 4'     Pulleys       Ball on the wall       PREs       Chin tucks wall Added with AAROM cervical flexion on     Added GH movements    scap retract  x10    tbands-  Mid rows  LPD  B ER  3 way star  B GH ER/Row combo   Green   Green   Green   Green    2  1  2    1   x10  x10  x10         Back on wall for TC   Push up PLUS   x10            Prone    Prone to correct C2 FRSR, C4 NR:SR/ERSL, C3/C5 FRSL, T1 NRRSL/ERSR, T2 FRSR, R 1st rib inferior glides followed with R SCM stretches 30\" X3, L STM/MFR platysmus, Prone TP PA manipulations C2-T2 Grade II; R GH nferior Joint Mobs Grades III-IV, Anterior-Posterior Joint Mobs Grades III-IV, Shoulder PROM all planes x 25'  11/6: MET to correct T1 ERSR, T2 NRRSL/FRSR, T3 ERSL, T4 FRSL, R 1st rib inferior glides followed with R SCM stretches 30\" X3, L STM/MFR platysmus, Prone TP PA manipulations T5-T8    HEP instruction:  10/1: added desensitization for scar and SOR with tennis balls in sock   -patient provided with written and illustrated instructions for HEP (see scanned image in )    Therapeutic Exercise and NMR EXR  [x] (15460) Provided verbal/tactile cueing for activities related to strengthening, flexibility, endurance, ROM for improvements in  [] LE / Lumbar: LE, proximal hip, and core control with self care, mobility, lifting, ambulation. [x] UE / Cervical: cervical, postural, scapular, scapulothoracic and UE control with self care, reaching, carrying, lifting, house/yardwork, driving, computer work.  [] (09587) Provided verbal/tactile cueing for activities related to improving balance, coordination, kinesthetic sense, posture, motor skill, proprioception to assist with   [] LE / lumbar: LE, proximal hip, and core control in self care, mobility, lifting, ambulation and eccentric single leg control. [] UE / cervical: cervical, scapular, scapulothoracic and UE control with self care, reaching, carrying, lifting, house/yardwork, driving, computer work.   [] (90401) Therapist is in constant attendance of 2 or more patients providing skilled therapy interventions, but not providing any significant amount of measurable one-on-one time to either patient, for improvements in  [] LE / lumbar: LE, proximal hip, and core control in self care, mobility, lifting, ambulation and eccentric single leg control.    [] UE / cervical: cervical, scapular, scapulothoracic and UE control with self care, reaching, carrying, lifting, house/yardwork, driving, computer work. NMR and Therapeutic Activities:    [x] (46066 or 76893) Provided verbal/tactile cueing for activities related to improving balance, coordination, kinesthetic sense, posture, motor skill, proprioception and motor activation to allow for proper function of   [] LE: / Lumbar core, proximal hip and LE with self care and ADLs  [x] UE / Cervical: cervical, postural, scapular, scapulothoracic and UE control with self care, carrying, lifting, driving, computer work.   [] (59125) Gait Re-education- Provided training and instruction to the patient for proper LE, core and proximal hip recruitment and positioning and eccentric body weight control with ambulation re-education including up and down stairs     Home Management Training / Self Care:  [] (75722) Provided self-care/home management training related to activities of daily living and compensatory training, and/or use of adaptive equipment for improvement with: ADLs and compensatory training, meal preparation, safety procedures and instruction in use of adaptive equipment, including bathing, grooming, dressing, personal hygiene, basic household cleaning and chores.      Home Exercise Program:  (04017) Reviewed/Progressed HEP activities related to strengthening, flexibility, endurance, ROM of   [] LE / Lumbar: core, proximal hip and LE for functional self-care, mobility, lifting and ambulation/stair navigation   [] UE / Cervical: cervical, postural, scapular, scapulothoracic and UE control with self care, reaching, carrying, lifting, house/yardwork, driving, computer work  [] (85004)Reviewed/Progressed HEP activities related to improving balance, coordination, kinesthetic sense, posture, motor skill, proprioception of   [] LE: core, proximal hip and LE for self care, mobility, lifting, and ambulation/stair navigation    [] UE goal 3: Pt will return to working full duty. Goal not met  Long term goal 4: Pt will be report scar is less sensitive to touch and that she is able to sleep on her back without scar discomfort. Progressing  Long term goal 5: Pt will be independent with HEP for improved long term health. ongoing    Progression Towards Functional goals:  [] Patient is progressing as expected towards functional goals listed. [x] Progression is slowed due to complexities listed. [] Progression has been slowed due to co-morbidities. [] Plan just implemented, too soon to assess goals progression  [] Other:     Persisting Functional Limitations/Impairments:  [x]Sleeping []Sitting               []Standing []Transfers        []Walking []Kneeling               []Stairs []Squatting / bending   []ADLs []Reaching  [x]Lifting  [x]Housework  []Driving [x]Job related tasks  [x]Sports/Recreation []Other:        ASSESSMENT:  Pt presents with  Improved  thoracic mobility after manual PT this date. Patient slowly increasing tolerance to resisted exercises again for her trunk/core. .  Patient will likely benefit from skilled PT to address her  impairments and functional limitations to restore her to her PLOF. Treatment/Activity Tolerance:  [x] Patient tolerated treatment well [] Patient limited by fatigue  [] Patient limited by pain  [] Patient limited by other medical complications  [x] Other: improved motion of high c spine - still stuck in L rot but increased movement felt, advised pt to continue with stretching and posture focus    Prognosis: [x] Good [] Fair  [] Poor    Patient Requires Follow-up: [x] Yes  [] No    PLAN: See eval. PT 1x / week for 10 weeks.    [x] Continue per plan of care [] Alter current plan (see comments)  [] Plan of care initiated [] Hold pending MD visit [] Discharge    Electronically signed by:     Kain Newton PT #[id number]

## 2020-01-17 ENCOUNTER — HOSPITAL ENCOUNTER (OUTPATIENT)
Dept: PHYSICAL THERAPY | Age: 25
Setting detail: THERAPIES SERIES
Discharge: HOME | End: 2020-01-17
Payer: COMMERCIAL

## 2020-01-17 PROCEDURE — 97112 NEUROMUSCULAR REEDUCATION: CPT

## 2020-01-17 PROCEDURE — 97140 MANUAL THERAPY 1/> REGIONS: CPT

## 2020-01-17 NOTE — FLOWSHEET NOTE
[facility] - Outpatient Physical Therapy      Physical Therapy Daily Treatment Note  Date:  2020    Patient Name:  Luly Raza    :  1995  MRN: [id number]  Restrictions/Precautions:  Medical/Treatment Diagnosis Information:  · Diagnosis: M54.2 (ICD-10-CM) - Cervicalgia  Treatment Diagnosis: hypersensitivity along scar, decreased strength in the R UE, decreased  strength on R, positive spurling's, malalignment in cerv spine  Insurance/Certification information:  PT Insurance Information: precious [facility]- no precert, 64% co-insurance. only allows one 75992 (traction) code per day. 30 PT/OT/ST per year. Physician Information:  Referring Practitioner: Elizabeth Casas MD  Plan of care signed (Y/N):Y  Date of Patient follow up with Physician:     Progress Note: []  Yes  [x]  No  Next due by: Visit #19       Latex Allergy:  [x]NO      []YES  Preferred Language for Healthcare:   [x]English       []other:    Visit # Insurance Allowable Date Range (if applicable)   + 267-90 30 (PT,OT,ST)  10 visits  used   2020    N/A     Pain level:  5-6/10     SUBJECTIVE:   Patient states she was sore after her last PT session because she had not been in PT for a while. She did some report  \"burning\" in her ears and front of her shoulders. States she felt some numbness in her feet yesterday as well.   OBJECTIVE:   :   Observation/Palpation:  :Depressed L shoulder girdle to T3; elevated R shoulder girdle to T1; B SCM tightness    Therapeutic Exercises  Resistance / level Sets/sec Reps Notes   UBE  2.0  mins forward and retro ; 4'     Pulleys       Ball on the wall       PREs       Chin tucks wall Added with AAROM cervical flexion on     Added GH movements    scap retract  x10    tbands-  Mid rows  LPD  B ER  3 way star  B GH ER/Row combo   Green   Green   Green   Green    2  1  2    1   x10  x10  x10         Back on wall for TC   Push up PLUS   x10            Prone    Prone Discharge    Electronically signed by:     Benton Mccord PT #[id number]

## 2020-01-22 ENCOUNTER — HOSPITAL ENCOUNTER (OUTPATIENT)
Dept: PHYSICAL THERAPY | Age: 25
Setting detail: THERAPIES SERIES
Discharge: HOME | End: 2020-01-22
Payer: COMMERCIAL

## 2020-01-28 ENCOUNTER — HOSPITAL ENCOUNTER (OUTPATIENT)
Dept: PHYSICAL THERAPY | Age: 25
Setting detail: THERAPIES SERIES
Discharge: HOME | End: 2020-01-28
Payer: COMMERCIAL

## 2020-01-28 PROCEDURE — 97140 MANUAL THERAPY 1/> REGIONS: CPT

## 2020-01-28 PROCEDURE — 97112 NEUROMUSCULAR REEDUCATION: CPT

## 2020-01-28 NOTE — FLOWSHEET NOTE
[facility] - Outpatient Physical Therapy      Physical Therapy Daily Treatment Note  Date:  2020    Patient Name:  Deon Kawasaki    :  1995  MRN: [id number]  Restrictions/Precautions:  Medical/Treatment Diagnosis Information:  · Diagnosis: M54.2 (ICD-10-CM) - Cervicalgia  Treatment Diagnosis: hypersensitivity along scar, decreased strength in the R UE, decreased  strength on R, positive spurling's, malalignment in cerv spine  Insurance/Certification information:  PT Insurance Information: precious [facility]- no precert, 40% co-insurance. only allows one 14052 (traction) code per day. 30 PT/OT/ST per year. Physician Information:  Referring Practitioner: Carmen Garcia MD  Plan of care signed (Y/N):Y  Date of Patient follow up with Physician:     Progress Note: []  Yes  [x]  No  Next due by: Visit #19       Latex Allergy:  [x]NO      []YES  Preferred Language for Healthcare:   [x]English       []other:    Visit # Insurance Allowable Date Range (if applicable)   + 043-71 11 (PT,OT,ST)  10 visits 2019 used   2020    N/A     Pain level:  /10     SUBJECTIVE:   Patient states she had the cold/flu but is doing better now. She states her neck/HA/upper back are feeling much better at a 3/10.    OBJECTIVE:   :   Observation/Palpation:  :See manual section below  :Depressed L shoulder girdle to T3; elevated R shoulder girdle to T1; B SCM tightness    Therapeutic Exercises  Resistance / level Sets/sec Reps Notes   UBE  2.0  mins forward and retro ; 4'     Pulleys       Ball on the wall [employer] CW/CCW 1 15 Added  for NMR   PREs       Chin tucks wall B GH [id number] with AAROM cervical flexion on     Added GH movements    scap retract  x10    tbands-  Mid rows  LPD  B ER  3 way star  B GH ER/Row combo   Green   Green   Green    [place]   2  1  2  2     x10  x10  x10  x5 B       Back on wall for TC   Push up PLUS   x10            Prone    Prone Cervical extension isos improvements in  [] LE / lumbar: LE, proximal hip, and core control in self care, mobility, lifting, ambulation and eccentric single leg control. [] UE / cervical: cervical, scapular, scapulothoracic and UE control with self care, reaching, carrying, lifting, house/yardwork, driving, computer work. NMR and Therapeutic Activities:    [x] (88795 or 35189) Provided verbal/tactile cueing for activities related to improving balance, coordination, kinesthetic sense, posture, motor skill, proprioception and motor activation to allow for proper function of   [] LE: / Lumbar core, proximal hip and LE with self care and ADLs  [x] UE / Cervical: cervical, postural, scapular, scapulothoracic and UE control with self care, carrying, lifting, driving, computer work.   [] (76851) Gait Re-education- Provided training and instruction to the patient for proper LE, core and proximal hip recruitment and positioning and eccentric body weight control with ambulation re-education including up and down stairs     Home Management Training / Self Care:  [] (13650) Provided self-care/home management training related to activities of daily living and compensatory training, and/or use of adaptive equipment for improvement with: ADLs and compensatory training, meal preparation, safety procedures and instruction in use of adaptive equipment, including bathing, grooming, dressing, personal hygiene, basic household cleaning and chores.      Home Exercise Program:  (17150) Reviewed/Progressed HEP activities related to strengthening, flexibility, endurance, ROM of   [] LE / Lumbar: core, proximal hip and LE for functional self-care, mobility, lifting and ambulation/stair navigation   [] UE / Cervical: cervical, postural, scapular, scapulothoracic and UE control with self care, reaching, carrying, lifting, house/yardwork, driving, computer work  [] (20008)Reviewed/Progressed HEP activities related to improving balance, coordination, kinesthetic sense, posture, motor skill, proprioception of   [] LE: core, proximal hip and LE for self care, mobility, lifting, and ambulation/stair navigation    [] UE / Cervical: cervical, postural,  scapular, scapulothoracic and UE control with self care, reaching, carrying, lifting, house/yardwork, driving, computer work    Manual Treatments:  PROM / STM / Oscillations-Mobs:  G-I, II, III, IV (PA's, Inf., Post.)  [x] (50510) Provided manual therapy to mobilize LE, proximal hip and/or LS spine soft tissue/joints for the purpose of modulating pain, promoting relaxation,  increasing ROM, reducing/eliminating soft tissue swelling/inflammation/restriction, improving soft tissue extensibility and allowing for proper ROM for normal function with   [] LE / lumbar: self care, mobility, lifting and ambulation. [x] UE / Cervical: self care, reaching, carrying, lifting, house/yardwork, driving, computer work. Modalities:  [x] (85404) Vasopneumatic compression: Utilized vasopneumatic compression to decrease edema / swelling for the purpose of improving mobility and quad tone / recruitment which will allow for increased overall function including but not limited to self-care, transfers, ambulation, and ascending / descending stairs.        Modalities: 1/28, 1/8, 11/19, 11/11,  11/6, 11/4, 10/28,10/23: MHP to cerv spine in supine w/ bolster under the knees x10 min  10/14, 10/11: MHP to cerv psine in sitting x 10 mins     Charges:  Timed Code Treatment Minutes: 28   Total Treatment Minutes: 38     [] EVAL - LOW (42218)   [] EVAL - MOD (51970)  [] EVAL - HIGH (49355)  [] RE-EVAL (81842)  [] TE (00352) x   1   [] Ionto  [x] NMR (49125) x   1   [] Vaso  [x] Manual (79381) x1    [] Ultrasound  [] TA x       [] Mech Traction (59816)  [] Gait Training x     [] ES (un) (32689):   [] Aquatic therapy x   [] Other:   [] Group:     GOALS:   Long term goals  Time Frame for Long term goals : 10 weeks  Long term goal 1: Pt will demo 5/5 strength visit [] Discharge    Electronically signed by:     Codi Meyer PT #[id number]

## 2020-01-30 ENCOUNTER — HOSPITAL ENCOUNTER (OUTPATIENT)
Dept: PHYSICAL THERAPY | Age: 25
Setting detail: THERAPIES SERIES
Discharge: HOME | End: 2020-01-30
Payer: COMMERCIAL

## 2020-01-30 PROCEDURE — 97140 MANUAL THERAPY 1/> REGIONS: CPT

## 2020-01-30 PROCEDURE — 97112 NEUROMUSCULAR REEDUCATION: CPT

## 2020-01-30 NOTE — FLOWSHEET NOTE
[facility] - Outpatient Physical Therapy      Physical Therapy Daily Treatment Note  Date:  2020    Patient Name:  Diane Rodrigez    :  1995  MRN: [id number]  Restrictions/Precautions:  Medical/Treatment Diagnosis Information:  · Diagnosis: M54.2 (ICD-10-CM) - Cervicalgia  Treatment Diagnosis: hypersensitivity along scar, decreased strength in the R UE, decreased  strength on R, positive spurling's, malalignment in cerv spine  Insurance/Certification information:  [facility], 52% co-insurance. only allows one 49324 (traction) code per day. 30 PT/OT/ST per year. Physician Information:  Referring Practitioner: Diane Alejo MD  Plan of care signed (Y/N):Y  Date of Patient follow up with Physician:     Progress Note: []  Yes  [x]  No  Next due by: Visit #19       Latex Allergy:  [x]NO      []YES  Preferred Language for Healthcare:   [x]English       []other:    Visit # Insurance Allowable Date Range (if applicable)   + [phone] 02 (PT,OT,ST)  11 visits 2019 used   2020    N/A     Pain level: 4/10 but with palpation 7-8/10 on SCM    SUBJECTIVE:   Patient states shereen pain behind her ears is the most painful. Pain in her neck is a 2-3/10 on average, but with overhead activity can increase to a 4-5/10. Patient denies actual HAs now but says her hair follicles are tight.      OBJECTIVE:   :   Observation/Palpation:  :See manual section below  :Depressed L shoulder girdle to T3; elevated R shoulder girdle to T1; B SCM tightness    Therapeutic Exercises  Resistance / level Sets/sec Reps Notes   UBE  2.0  mins forward and retro ; 4'     Pulleys       Ball on the wall [employer] CW/CCW 1 15 Added  for NMR   PREs       Chin tucks wall hin tuck neutral (CT)  CT with:  B Scapular retraction B GH flexion to 90 degrees'B GH ER0#            1# /2      2 x10 each             2 X10      10 Added with AAROM cervical flexion on 11/4    Added [address] movements 11/6 1/30: added orange band   scap retract  x10    tbands-  Mid rows  LPD  B ER  3 way star  B GH ER/Row combo  [id number] B      Back on wall for TC   Push up PLUS   x10            Prone    Prone Cervical extension isos   5\" 8    Neuromuscular Re-ed / Therapeutic Activities       Counter to shelf       rebounder       1/2 foam roll with CT and scap retract  1/2 foam roll along spine with B GHJ abd and CT     Foam roll horizontally on wall - self mobs and thor ext       90/90 toss and catch at wall     Wall Slides     Overhead Lower trapezius lift offs  2 10 Added 1/28 for NMR   Facing away from wall Lower trapezius arm slides  1 10    Prone B GH ext Prone rows Prone B Mid rows with elbow flexed Prone B Lower trapezius rows with elbow flexed Prone cervical extension isometrics  110 1/9 for scapular NMR comtrol   REverse W's     Manual Intervention             X3   X13'    Cervical unloading/traction  X 4 mins   Cervical mobs PA C6-7   sideglides R C3, C5-7    R 1st rib mob    Thoracic PA's T1-4   Manual/MET  See below                Pt. Education:  10/1: Reassessed goals, discussed progress made and areas remaining for improvement, issued outcome measure and advised on using ice and desensitization techniques at home on scar, issued tennis balls in sock for home craniocradle    -patient educated on diagnosis, prognosis and expectations for rehab  -all patient questions were answered    Manual:  1/30: STM/MFR B platysmus (R more than L), STM upper thoracic and cervical pS/Levator, UT, gentle R TMJ joint distraction externally, L SCM stretches 30\" X3, SOR, manual cervical traction X10'  1/28: STM upper thoracic and cervical pS/Levator, UT, MET to correct C5 ERSL, C6 FRSR, C7 ERSR, SOR, Manual cervical traction X12'  1/15: STM upper thoracic ps /UT/Levator, Supine manipulation/ thoracic extension based at T4-T5, T8 L TP and corresponding ribs Grade III PA glides, PA glides T2-t8 endurance, ROM of   [] LE / Lumbar: core, proximal hip and LE for functional self-care, mobility, lifting and ambulation/stair navigation   [] UE / Cervical: cervical, postural, scapular, scapulothoracic and UE control with self care, reaching, carrying, lifting, house/yardwork, driving, computer work  [] (68918)Reviewed/Progressed HEP activities related to improving balance, coordination, kinesthetic sense, posture, motor skill, proprioception of   [] LE: core, proximal hip and LE for self care, mobility, lifting, and ambulation/stair navigation    [] UE / Cervical: cervical, postural,  scapular, scapulothoracic and UE control with self care, reaching, carrying, lifting, house/yardwork, driving, computer work    Manual Treatments:  PROM / STM / Oscillations-Mobs:  G-I, II, III, IV (PA's, Inf., Post.)  [x] (80180) Provided manual therapy to mobilize LE, proximal hip and/or LS spine soft tissue/joints for the purpose of modulating pain, promoting relaxation,  increasing ROM, reducing/eliminating soft tissue swelling/inflammation/restriction, improving soft tissue extensibility and allowing for proper ROM for normal function with   [] LE / lumbar: self care, mobility, lifting and ambulation. [x] UE / Cervical: self care, reaching, carrying, lifting, house/yardwork, driving, computer work. Modalities:  [x] (07676) Vasopneumatic compression: Utilized vasopneumatic compression to decrease edema / swelling for the purpose of improving mobility and quad tone / recruitment which will allow for increased overall function including but not limited to self-care, transfers, ambulation, and ascending / descending stairs.        Modalities: 1/30, 1/28, 1/8, 11/19, 11/11,  11/6, 11/4, 10/28,10/23: MHP to cerv spine in supine w/ bolster under the knees x10 min  10/14, 10/11: MHP to cerv psine in sitting x 10 mins     Charges:  Timed Code Treatment Minutes: 31   Total Treatment Minutes: 41     [] EVAL - LOW (05685)   [] EVAL -

## 2020-02-04 ENCOUNTER — HOSPITAL ENCOUNTER (OUTPATIENT)
Dept: PHYSICAL THERAPY | Age: 25
Setting detail: THERAPIES SERIES
Discharge: HOME | End: 2020-02-04
Payer: COMMERCIAL

## 2020-02-06 ENCOUNTER — HOSPITAL ENCOUNTER (OUTPATIENT)
Dept: PHYSICAL THERAPY | Age: 25
Setting detail: THERAPIES SERIES
Discharge: HOME | End: 2020-02-06
Payer: COMMERCIAL

## 2020-02-06 PROCEDURE — 97140 MANUAL THERAPY 1/> REGIONS: CPT

## 2020-02-06 PROCEDURE — 97110 THERAPEUTIC EXERCISES: CPT

## 2020-02-06 PROCEDURE — 97112 NEUROMUSCULAR REEDUCATION: CPT

## 2020-02-06 NOTE — FLOWSHEET NOTE
SCM stretches 30\" X3, SOR, manual cervical traction X10'  1/28: STM upper thoracic and cervical pS/Levator, UT, MET to correct C5 ERSL, C6 FRSR, C7 ERSR, SOR, Manual cervical traction X12'  1/15: STM upper thoracic ps /UT/Levator, Supine manipulation/ thoracic extension based at T4-T5, T8 L TP and corresponding ribs Grade III PA glides, PA glides T2-t8 Grade III, MET to correct C7 ERSL, T1 FRSR, R 1st rib respiratory inferior glides followed with R SCM stretches 20\" X3  1/9: T7/T Manipulation , T2-T4 R TP PA glides and corresponding ribs, MET to correct C7 FRSR, T1 ERSL, L 1st rib respiratory inferior glides followed with L SCM stretches 30\" X4 X12'  1/7: TOS release, MET to correct C7 ERSR, C7 NRRSL/FRSR, C2 FRSR, C3 ERSR, R platysmus release, SOR X15'  11/19:Prone PA glides T2-T8 Grade III, R TP PA glides T4-T7 and corresponding ribs as well as L TP PA glides to T3 and L 3rd rib X8'  11/11:MET to correct C2 FRSR, C4 NR:SR/ERSL, C3/C5 FRSL, T1 NRRSL/ERSR, T2 FRSR, R 1st rib inferior glides followed with R SCM stretches 30\" X3, L STM/MFR platysmus, Prone TP PA manipulations C2-T2 Grade II; R GH nferior Joint Mobs Grades III-IV, Anterior-Posterior Joint Mobs Grades III-IV, Shoulder PROM all planes x 25'  11/6: MET to correct T1 ERSR, T2 NRRSL/FRSR, T3 ERSL, T4 FRSL, R 1st rib inferior glides followed with R SCM stretches 30\" X3, L STM/MFR platysmus, Prone TP PA manipulations T5-T8    HEP instruction:  10/1: added desensitization for scar and SOR with tennis balls in sock   -patient provided with written and illustrated instructions for HEP (see scanned image in )    Therapeutic Exercise and NMR EXR  [x] (04095) Provided verbal/tactile cueing for activities related to strengthening, flexibility, endurance, ROM for improvements in  [] LE / Lumbar: LE, proximal hip, and core control with self care, mobility, lifting, ambulation.   [x] UE / Cervical: cervical, postural, scapular, scapulothoracic and UE control with self care, reaching, carrying, lifting, house/yardwork, driving, computer work.  [] (41157) Provided verbal/tactile cueing for activities related to improving balance, coordination, kinesthetic sense, posture, motor skill, proprioception to assist with   [] LE / lumbar: LE, proximal hip, and core control in self care, mobility, lifting, ambulation and eccentric single leg control. [] UE / cervical: cervical, scapular, scapulothoracic and UE control with self care, reaching, carrying, lifting, house/yardwork, driving, computer work.   [] (78516) Therapist is in constant attendance of 2 or more patients providing skilled therapy interventions, but not providing any significant amount of measurable one-on-one time to either patient, for improvements in  [] LE / lumbar: LE, proximal hip, and core control in self care, mobility, lifting, ambulation and eccentric single leg control. [] UE / cervical: cervical, scapular, scapulothoracic and UE control with self care, reaching, carrying, lifting, house/yardwork, driving, computer work.      NMR and Therapeutic Activities:    [x] (41315 or 17153) Provided verbal/tactile cueing for activities related to improving balance, coordination, kinesthetic sense, posture, motor skill, proprioception and motor activation to allow for proper function of   [] LE: / Lumbar core, proximal hip and LE with self care and ADLs  [x] UE / Cervical: cervical, postural, scapular, scapulothoracic and UE control with self care, carrying, lifting, driving, computer work.   [] (70576) Gait Re-education- Provided training and instruction to the patient for proper LE, core and proximal hip recruitment and positioning and eccentric body weight control with ambulation re-education including up and down stairs     Home Management Training / Self Care:  [] (29294) Provided self-care/home management training related to activities of daily living and compensatory training, and/or use of adaptive equipment for improvement with: ADLs and compensatory training, meal preparation, safety procedures and instruction in use of adaptive equipment, including bathing, grooming, dressing, personal hygiene, basic household cleaning and chores. Home Exercise Program:  (97900) Reviewed/Progressed HEP activities related to strengthening, flexibility, endurance, ROM of   [] LE / Lumbar: core, proximal hip and LE for functional self-care, mobility, lifting and ambulation/stair navigation   [] UE / Cervical: cervical, postural, scapular, scapulothoracic and UE control with self care, reaching, carrying, lifting, house/yardwork, driving, computer work  [] (24491)Reviewed/Progressed HEP activities related to improving balance, coordination, kinesthetic sense, posture, motor skill, proprioception of   [] LE: core, proximal hip and LE for self care, mobility, lifting, and ambulation/stair navigation    [] UE / Cervical: cervical, postural,  scapular, scapulothoracic and UE control with self care, reaching, carrying, lifting, house/yardwork, driving, computer work    Manual Treatments:  PROM / STM / Oscillations-Mobs:  G-I, II, III, IV (PA's, Inf., Post.)  [x] (22667) Provided manual therapy to mobilize LE, proximal hip and/or LS spine soft tissue/joints for the purpose of modulating pain, promoting relaxation,  increasing ROM, reducing/eliminating soft tissue swelling/inflammation/restriction, improving soft tissue extensibility and allowing for proper ROM for normal function with   [] LE / lumbar: self care, mobility, lifting and ambulation. [x] UE / Cervical: self care, reaching, carrying, lifting, house/yardwork, driving, computer work.      Modalities:  [x] (68285) Vasopneumatic compression: Utilized vasopneumatic compression to decrease edema / swelling for the purpose of improving mobility and quad tone / recruitment which will allow for increased overall function including but not limited to self-care, myofascial bands in B Maseter/ppytergoids/ SCM and R platysmus. Also, observed deviation to the R with mouth opening and palpated TMJ hypermobility on the L. . Patient slowly increasing tolerance to resisted exercises again for her trunk/core. . After TMJ manual PT, patient reported ear pain nearly eliminated,  Patient will likely benefit from skilled PT to address her  impairments and functional limitations to restore her to her PLOF. Treatment/Activity Tolerance:  [x] Patient tolerated treatment well [] Patient limited by fatigue  [] Patient limited by pain  [] Patient limited by other medical complications  [x] Other: improved motion of high c spine - still stuck in L rot but increased movement felt, advised pt to continue with stretching and posture focus    Prognosis: [x] Good [] Fair  [] Poor    Patient Requires Follow-up: [x] Yes  [] No    PLAN: See eval. PT 1x / week for 10 weeks.    [x] Continue per plan of care [] Alter current plan (see comments)  [] Plan of care initiated [] Hold pending MD visit [] Discharge    Electronically signed by:     Gisell Coello PT #[id number]

## 2020-02-12 ENCOUNTER — HOSPITAL ENCOUNTER (OUTPATIENT)
Dept: PHYSICAL THERAPY | Age: 25
Setting detail: THERAPIES SERIES
Discharge: HOME | End: 2020-02-12
Payer: COMMERCIAL

## 2020-02-12 PROCEDURE — 97140 MANUAL THERAPY 1/> REGIONS: CPT

## 2020-02-12 PROCEDURE — 97112 NEUROMUSCULAR REEDUCATION: CPT

## 2020-02-12 NOTE — FLOWSHEET NOTE
Cervical: cervical, postural, scapular, scapulothoracic and UE control with self care, reaching, carrying, lifting, house/yardwork, driving, computer work.  [] (43707) Provided verbal/tactile cueing for activities related to improving balance, coordination, kinesthetic sense, posture, motor skill, proprioception to assist with   [] LE / lumbar: LE, proximal hip, and core control in self care, mobility, lifting, ambulation and eccentric single leg control. [] UE / cervical: cervical, scapular, scapulothoracic and UE control with self care, reaching, carrying, lifting, house/yardwork, driving, computer work.   [] (89349) Therapist is in constant attendance of 2 or more patients providing skilled therapy interventions, but not providing any significant amount of measurable one-on-one time to either patient, for improvements in  [] LE / lumbar: LE, proximal hip, and core control in self care, mobility, lifting, ambulation and eccentric single leg control. [] UE / cervical: cervical, scapular, scapulothoracic and UE control with self care, reaching, carrying, lifting, house/yardwork, driving, computer work.      NMR and Therapeutic Activities:    [x] (66005 or 50211) Provided verbal/tactile cueing for activities related to improving balance, coordination, kinesthetic sense, posture, motor skill, proprioception and motor activation to allow for proper function of   [] LE: / Lumbar core, proximal hip and LE with self care and ADLs  [x] UE / Cervical: cervical, postural, scapular, scapulothoracic and UE control with self care, carrying, lifting, driving, computer work.   [] (69497) Gait Re-education- Provided training and instruction to the patient for proper LE, core and proximal hip recruitment and positioning and eccentric body weight control with ambulation re-education including up and down stairs     Home Management Training / Self Care:  [] (66190) Provided self-care/home management training related to activities of daily living and compensatory training, and/or use of adaptive equipment for improvement with: ADLs and compensatory training, meal preparation, safety procedures and instruction in use of adaptive equipment, including bathing, grooming, dressing, personal hygiene, basic household cleaning and chores. Home Exercise Program:  (83504) Reviewed/Progressed HEP activities related to strengthening, flexibility, endurance, ROM of   [] LE / Lumbar: core, proximal hip and LE for functional self-care, mobility, lifting and ambulation/stair navigation   [] UE / Cervical: cervical, postural, scapular, scapulothoracic and UE control with self care, reaching, carrying, lifting, house/yardwork, driving, computer work  [] (70960)Reviewed/Progressed HEP activities related to improving balance, coordination, kinesthetic sense, posture, motor skill, proprioception of   [] LE: core, proximal hip and LE for self care, mobility, lifting, and ambulation/stair navigation    [] UE / Cervical: cervical, postural,  scapular, scapulothoracic and UE control with self care, reaching, carrying, lifting, house/yardwork, driving, computer work    Manual Treatments:  PROM / STM / Oscillations-Mobs:  G-I, II, III, IV (PA's, Inf., Post.)  [x] (08642) Provided manual therapy to mobilize LE, proximal hip and/or LS spine soft tissue/joints for the purpose of modulating pain, promoting relaxation,  increasing ROM, reducing/eliminating soft tissue swelling/inflammation/restriction, improving soft tissue extensibility and allowing for proper ROM for normal function with   [] LE / lumbar: self care, mobility, lifting and ambulation. [x] UE / Cervical: self care, reaching, carrying, lifting, house/yardwork, driving, computer work.      Modalities:  [x] (94401) Vasopneumatic compression: Utilized vasopneumatic compression to decrease edema / swelling for the purpose of improving mobility and quad tone / recruitment which will allow for increased Scapular/upper quarter exercises with resistance this date.  ,  Patient will likely benefit from skilled PT to address her  impairments and functional limitations to restore her to her PLOF. Treatment/Activity Tolerance:  [x] Patient tolerated treatment well [] Patient limited by fatigue  [] Patient limited by pain  [] Patient limited by other medical complications  [x] Other: improved motion of high c spine - still stuck in L rot but increased movement felt, advised pt to continue with stretching and posture focus    Prognosis: [x] Good [] Fair  [] Poor    Patient Requires Follow-up: [x] Yes  [] No    PLAN: See eval. PT 1x / week for 10 weeks.    [x] Continue per plan of care [] Alter current plan (see comments)  [] Plan of care initiated [] Hold pending MD visit [] Discharge    Electronically signed by:     Teagan Shah PT #[id number]

## 2020-02-19 ENCOUNTER — HOSPITAL ENCOUNTER (OUTPATIENT)
Dept: PHYSICAL THERAPY | Age: 25
Setting detail: THERAPIES SERIES
Discharge: HOME | End: 2020-02-19
Payer: COMMERCIAL

## 2020-02-19 PROCEDURE — 97110 THERAPEUTIC EXERCISES: CPT

## 2020-02-19 PROCEDURE — 97140 MANUAL THERAPY 1/> REGIONS: CPT

## 2020-02-19 PROCEDURE — 97112 NEUROMUSCULAR REEDUCATION: CPT

## 2020-02-19 NOTE — FLOWSHEET NOTE
[facility] - Outpatient Physical Therapy      Physical Therapy Daily Treatment Note  Date:  2020    Patient Name:  Sharon Sterling    :  1995  MRN: [id number]  Restrictions/Precautions:  Medical/Treatment Diagnosis Information:  · Diagnosis: M54.2 (ICD-10-CM) - Cervicalgia  Treatment Diagnosis: hypersensitivity along scar, decreased strength in the R UE, decreased  strength on R, positive spurling's, malalignment in cerv spine  Insurance/Certification information:  PT Insurance Information: precious [facility]- no precert, 69% co-insurance. only allows one 66073 (traction) code per day. 30 PT/OT/ST per year. Physician Information:  Referring Practitioner: Carlyn Alvares MD  Plan of care signed (Y/N):Y  Date of Patient follow up with Physician:     Progress Note: []  Yes  [x]  No  Next due by: Visit #19       Latex Allergy:  [x]NO      []YES  Preferred Language for Healthcare:   [x]English       []other:    Visit # Insurance Allowable Date Range (if applicable)   + [phone] (PT,OT,ST)  11 visits 2019 used   2020    N/A     Pain level: 2/10   SUBJECTIVE:  Patient states her ear pain is much reduced now since last PT session with jaw massage. She was able to go to the gym 2 days ago and just do some easy stretches.  l.  Pain in her neck is a 2-3/10 on average, but with overhead activity can increase to a 4-5/10.    OBJECTIVE:   :   Observation/Palpation:  :See manual section below  :Depressed L shoulder girdle to T3; elevated R shoulder girdle to T1; B SCM tightness    Therapeutic Exercises  Resistance / level Sets/sec Reps Notes   UBE  2.0  mins forward and retro ; 4'     Pulleys       Ball on the wall Added  for NMR   PREs       Chin tucks wall Added with AAROM cervical flexion on     Added [address] movements : added orange band   scap retract  x10    tbands-  Mid rows  LPD  B ER  [address] star  B GH ER/Row combo [employer] [id number]      Back on wall for TC   Push up PLUS   x10            Prone    Prone Cervical extension isos   5\" 8    Neuromuscular Re-ed / Therapeutic Activities       TMJ clucks  Added 2/6   TMJ open with tip of tongue behind top row teeth  Added 2/6          Counter to shelf       rebounder       1/2 foam roll with CT and scap retract  1/2 foam roll along spine with B GHJ abd and CT     Foam roll horizontally on wall - self mobs and thor ext       90/90 toss and catch at wall     Wall Slides     Overhead Lower trapezius lift offs  Added 1/28 for NMR   Facing away from wall Lower trapezius arm slides  1 10    Prone B GH ext Prone rows Prone B Mid rows with elbow flexed Prone B Lower trapezius rows with elbow flexed Prone cervical extension isometrics  110 1/9 for scapular NMR comtrol   REverse W's  5\" 10    Supine B GH ER 2# 1 12 Added 2/12   Supine B GH Horizontal AB 2#  1 12 Added 2/12   Supine B GH flexion 2# 1 12 Added 2/19    Supine D2 Flex  1 12 Added 2/12          Manual Intervention             X3   X13'    Cervical unloading/traction  X 4 mins   Cervical mobs PA C6-7   sideglides R C3, C5-7    R 1st rib mob    Thoracic PA's T1-4   Manual/MET  See below       R 5th rib manipulation         Pt.  Education:  10/1: Reassessed goals, discussed progress made and areas remaining for improvement, issued outcome measure and advised on using ice and desensitization techniques at home on scar, issued tennis balls in sock for home craniocradle    -patient educated on diagnosis, prognosis and expectations for rehab  -all patient questions were answered    Manual:   2/19: MET to correct T4 FRSL, T3 FRSR, T2 ERSR, Thoracic manipulations T5 and T6 as well as corresponding ribs , SOR, manual cervical traction X13'  2/12: MET to correct C7 ERSL, T1 FRSL, T2 FRSR, T3 ERSL, SOR, Gentle manual cervical traction, B Pec minor strtehes 30\" X3; X14'  2/6: MET to correct T1FRSL, T2ERSR, C6 ERSL, C7 FRSR, R 5TH RIB cueing for activities related to strengthening, flexibility, endurance, ROM for improvements in  [] LE / Lumbar: LE, proximal hip, and core control with self care, mobility, lifting, ambulation. [x] UE / Cervical: cervical, postural, scapular, scapulothoracic and UE control with self care, reaching, carrying, lifting, house/yardwork, driving, computer work.  [] (19064) Provided verbal/tactile cueing for activities related to improving balance, coordination, kinesthetic sense, posture, motor skill, proprioception to assist with   [] LE / lumbar: LE, proximal hip, and core control in self care, mobility, lifting, ambulation and eccentric single leg control. [] UE / cervical: cervical, scapular, scapulothoracic and UE control with self care, reaching, carrying, lifting, house/yardwork, driving, computer work.   [] (27413) Therapist is in constant attendance of 2 or more patients providing skilled therapy interventions, but not providing any significant amount of measurable one-on-one time to either patient, for improvements in  [] LE / lumbar: LE, proximal hip, and core control in self care, mobility, lifting, ambulation and eccentric single leg control. [] UE / cervical: cervical, scapular, scapulothoracic and UE control with self care, reaching, carrying, lifting, house/yardwork, driving, computer work.      NMR and Therapeutic Activities:    [x] (73233 or 34911) Provided verbal/tactile cueing for activities related to improving balance, coordination, kinesthetic sense, posture, motor skill, proprioception and motor activation to allow for proper function of   [] LE: / Lumbar core, proximal hip and LE with self care and ADLs  [x] UE / Cervical: cervical, postural, scapular, scapulothoracic and UE control with self care, carrying, lifting, driving, computer work.   [] (79256) Gait Re-education- Provided training and instruction to the patient for proper LE, core and proximal hip recruitment and positioning and Limitations/Impairments:  [x]Sleeping []Sitting               []Standing []Transfers        []Walking []Kneeling               []Stairs []Squatting / bending   []ADLs []Reaching  [x]Lifting  [x]Housework  []Driving [x]Job related tasks  [x]Sports/Recreation []Other:        ASSESSMENT[de-identified] Patient able to tolerate increased PRES with cervical/scapular stability. She still has thoracic decreased flexibility at T5/T6 with excessive mobility at CT junction. Patient will likely benefit from skilled PT to address her  impairments and functional limitations to restore her to her PLOF. Treatment/Activity Tolerance:  [x] Patient tolerated treatment well [] Patient limited by fatigue  [] Patient limited by pain  [] Patient limited by other medical complications  [x] Other: improved motion of high c spine - still stuck in L rot but increased movement felt, advised pt to continue with stretching and posture focus    Prognosis: [x] Good [] Fair  [] Poor    Patient Requires Follow-up: [x] Yes  [] No    PLAN: See eval. PT 1x / week for 10 weeks.    [x] Continue per plan of care [] Alter current plan (see comments)  [] Plan of care initiated [] Hold pending MD visit [] Discharge    Electronically signed by:     Esme Whipple PT #[id number]

## 2020-02-26 ENCOUNTER — HOSPITAL ENCOUNTER (OUTPATIENT)
Dept: PHYSICAL THERAPY | Age: 25
Setting detail: THERAPIES SERIES
Discharge: HOME | End: 2020-02-26
Payer: COMMERCIAL

## 2020-02-26 PROCEDURE — 97110 THERAPEUTIC EXERCISES: CPT

## 2020-02-26 PROCEDURE — 97140 MANUAL THERAPY 1/> REGIONS: CPT

## 2020-02-26 NOTE — PROGRESS NOTES
Referring Practitioner: Danette Rob MD  Plan of care signed (Y/N):Y  Date of Patient follow up with Physician: unknown    Progress Note: [x]  Yes discharge  []  No  Next due by: Visit #19       Latex Allergy:  [x]NO      []YES  Preferred Language for Healthcare:   [x]English       []other:    Visit # Insurance Allowable Date Range (if applicable)   + [phone] 30 (PT,OT,ST)  11 visits 2019 used   7/30 2020    N/A     Pain level: 2/10   SUBJECTIVE:  Patient reports her overall function and pain level decrease has improved by 75%. Patient states her ear pain is much reduced now since last PT session with jaw massage. HAs are 50% or better with no longer having migraines. Patient states lifting arms overhead to do her hair is fatiguing. Sleeping is mildly uncomfortable but she can support her head now and tolerate lying flat on her back part of the night. Overall, her NDI has improved and has a raw score of 16 or 32% disabled . She states she still has some HAs a few times a week, but are no longer migraines.       OBJECTIVE:   :   Observation/Palpation:  2/26: final PN/Discharge test measurements:  Strength: rhomboids B 4+/5, Latissimus B 4/5, middle trapezius 4/5 B, Lower trapezius R 4-/5, L 3+/5, longus colli 4+/5 Cervical AROM with Goniometer Flexion: chin to chest  Extension 45,  Rotation Right 85 Left 85  Scapulohumeral rhtyhm; WNL B with mild decreased lower trapezius recruitment end-range on L     1/28:See manual section below  1/7:Depressed L shoulder girdle to T3; elevated R shoulder girdle to T1; B SCM tightness    Therapeutic Exercises  Resistance / level Sets/sec Reps Notes   UBE  2.0  mins forward and retro ; 4'     Pulleys       Ball on the wall Added 1/28 for NMR   PREs       Chin tucks wall Added with AAROM cervical flexion on 11/4    Added [address] movements 11/6 1/30: added orange band   scap retract  x10    tbands-  Mid rows  LPD  B ER  3 way star  B GH ER/Row combo       Back on wall for TC   Push up PLUS   x10            Prone    Prone Cervical extension isos   5\" 8    Neuromuscular Re-ed / Therapeutic Activities       TMJ clucks  Added 2/6   TMJ open with tip of tongue behind top row teeth  Added 2/6          Counter to shelf       rebounder       1/2 foam roll with CT and scap retract  1/2 foam roll along spine with B GHJ abd and CT     Foam roll horizontally on wall - self mobs and thor ext       90/90 toss and catch at wall     Wall Slides     Overhead Lower trapezius lift offs  Added 1/28 for NMR   Facing away from wall Lower trapezius arm slides  1 10    Prone B GH ext 0# 2 10 Prone rows 0# 2 10 Prone B Mid rows with arms extended 0# AROM 110Prone B Lower trapezius rows with elbow flexed 0# isos  0# AROM  1  2 10  10 Prone cervical extension isometrics  110 1/9 for scapular NMR comtrol   REverse W's  5\" 10    Supine B GH ER 2# 1 12 Added 2/12   Supine B GH Horizontal AB 2#  1 12 Added 2/12   Supine B GH flexion 2# 1 12 Added 2/19    Supine D2 Flex  1 12 Added 2/12          Manual Intervention             X3   X13'    Cervical unloading/traction  X 4 mins   Cervical mobs PA C6-7   sideglides R C3, C5-7    R 1st rib mob    Thoracic PA's T1-4   Manual/MET                Pt. Education:  10/1: Reassessed goals, discussed progress made and areas remaining for improvement, issued outcome measure and advised on using ice and desensitization techniques at home on scar, issued tennis balls in sock for home craniocradle    -patient educated on diagnosis, prognosis and expectations for rehab  -all patient questions were answered    Manual:   2/26:Prone L TP PA glide of T8 and corresponding ribs, MET to correct C6 ERSL, C7 FRSL, C2 FRSR, SOR  2/19: MET to correct T4 FRSL, T3 FRSR, T2 ERSR, Thoracic manipulations T5 and T6 as well as corresponding ribs , SOR, manual cervical traction X13'  2/12: MET to correct C7 ERSL, T1 FRSL, T2 FRSR, T3 ERSL, SOR, Gentle manual cervical traction, B Pec minor strtehes 30\" X3; X14'  2/6: MET to correct T1FRSL, T2ERSR, C6 ERSL, C7 FRSR, R 5TH RIB MANIPULATION, C3FRSR, C4ERSR, OA Nods,B SCM stretches 30\" X3, intraoral masage to L/R Masseters/pytergoids, STM/MFR R platysmus X35'  1/30: STM/MFR B platysmus (R more than L), STM upper thoracic and cervical pS/Levator, UT, gentle R TMJ joint distraction externally, L SCM stretches 30\" X3, SOR, manual cervical traction X10'  1/28: STM upper thoracic and cervical pS/Levator, UT, MET to correct C5 ERSL, C6 FRSR, C7 ERSR, SOR, Manual cervical traction X12'  1/15: STM upper thoracic ps /UT/Levator, Supine manipulation/ thoracic extension based at T4-T5, T8 L TP and corresponding ribs Grade III PA glides, PA glides T2-t8 Grade III, MET to correct C7 ERSL, T1 FRSR, R 1st rib respiratory inferior glides followed with R SCM stretches 20\" X3  1/9: T7/T Manipulation , T2-T4 R TP PA glides and corresponding ribs, MET to correct C7 FRSR, T1 ERSL, L 1st rib respiratory inferior glides followed with L SCM stretches 30\" X4 X12'  1/7: TOS release, MET to correct C7 ERSR, C7 NRRSL/FRSR, C2 FRSR, C3 ERSR, R platysmus release, SOR X15'  11/19:Prone PA glides T2-T8 Grade III, R TP PA glides T4-T7 and corresponding ribs as well as L TP PA glides to T3 and L 3rd rib X8'  11/11:MET to correct C2 FRSR, C4 NR:SR/ERSL, C3/C5 FRSL, T1 NRRSL/ERSR, T2 FRSR, R 1st rib inferior glides followed with R SCM stretches 30\" X3, L STM/MFR platysmus, Prone TP PA manipulations C2-T2 Grade II; R GH nferior Joint Mobs Grades III-IV, Anterior-Posterior Joint Mobs Grades III-IV, Shoulder PROM all planes x 25'  11/6: MET to correct T1 ERSR, T2 NRRSL/FRSR, T3 ERSL, T4 FRSL, R 1st rib inferior glides followed with R SCM stretches 30\" X3, L STM/MFR platysmus, Prone TP PA manipulations T5-T8    HEP instruction:  10/1: added desensitization for scar and SOR with tennis balls in sock   -patient provided with written and illustrated instructions for HEP (see scanned image in media manager)    Therapeutic Exercise and NMR EXR  [x] (91739) Provided verbal/tactile cueing for activities related to strengthening, flexibility, endurance, ROM for improvements in  [] LE / Lumbar: LE, proximal hip, and core control with self care, mobility, lifting, ambulation. [x] UE / Cervical: cervical, postural, scapular, scapulothoracic and UE control with self care, reaching, carrying, lifting, house/yardwork, driving, computer work.  [] (49015) Provided verbal/tactile cueing for activities related to improving balance, coordination, kinesthetic sense, posture, motor skill, proprioception to assist with   [] LE / lumbar: LE, proximal hip, and core control in self care, mobility, lifting, ambulation and eccentric single leg control. [] UE / cervical: cervical, scapular, scapulothoracic and UE control with self care, reaching, carrying, lifting, house/yardwork, driving, computer work.   [] (08918) Therapist is in constant attendance of 2 or more patients providing skilled therapy interventions, but not providing any significant amount of measurable one-on-one time to either patient, for improvements in  [] LE / lumbar: LE, proximal hip, and core control in self care, mobility, lifting, ambulation and eccentric single leg control. [] UE / cervical: cervical, scapular, scapulothoracic and UE control with self care, reaching, carrying, lifting, house/yardwork, driving, computer work.      NMR and Therapeutic Activities:    [x] (26933 or 97960) Provided verbal/tactile cueing for activities related to improving balance, coordination, kinesthetic sense, posture, motor skill, proprioception and motor activation to allow for proper function of   [] LE: / Lumbar core, proximal hip and LE with self care and ADLs  [x] UE / Cervical: cervical, postural, scapular, scapulothoracic and UE control with self care, carrying, lifting, driving, computer work.   [] (64211) Gait Re-education- Provided training and instruction to the patient for proper LE, core and proximal hip recruitment and positioning and eccentric body weight control with ambulation re-education including up and down stairs     Home Management Training / Self Care:  [] (08686) Provided self-care/home management training related to activities of daily living and compensatory training, and/or use of adaptive equipment for improvement with: ADLs and compensatory training, meal preparation, safety procedures and instruction in use of adaptive equipment, including bathing, grooming, dressing, personal hygiene, basic household cleaning and chores. Home Exercise Program:  (72380) Reviewed/Progressed HEP activities related to strengthening, flexibility, endurance, ROM of   [] LE / Lumbar: core, proximal hip and LE for functional self-care, mobility, lifting and ambulation/stair navigation   [] UE / Cervical: cervical, postural, scapular, scapulothoracic and UE control with self care, reaching, carrying, lifting, house/yardwork, driving, computer work  [] (36859)Reviewed/Progressed HEP activities related to improving balance, coordination, kinesthetic sense, posture, motor skill, proprioception of   [] LE: core, proximal hip and LE for self care, mobility, lifting, and ambulation/stair navigation    [] UE / Cervical: cervical, postural,  scapular, scapulothoracic and UE control with self care, reaching, carrying, lifting, house/yardwork, driving, computer work    Manual Treatments:  PROM / STM / Oscillations-Mobs:  G-I, II, III, IV (PA's, Inf., Post.)  [x] (66431) Provided manual therapy to mobilize LE, proximal hip and/or LS spine soft tissue/joints for the purpose of modulating pain, promoting relaxation,  increasing ROM, reducing/eliminating soft tissue swelling/inflammation/restriction, improving soft tissue extensibility and allowing for proper ROM for normal function with   [] LE / lumbar: self care, mobility, lifting and ambulation.     [x] UE / Cervical: self care, reaching, carrying, lifting, house/yardwork, driving, computer work. Modalities:  [x] (79865) Vasopneumatic compression: Utilized vasopneumatic compression to decrease edema / swelling for the purpose of improving mobility and quad tone / recruitment which will allow for increased overall function including but not limited to self-care, transfers, ambulation, and ascending / descending stairs. Modalities: 2/2/19, 2/12, 1/30, 1/28, 1/8, 11/19, 11/11,  11/6, 11/4, 10/28,10/23: MHP to cerv spine in supine w/ bolster under the knees x10 min  10/14, 10/11: MHP to cerv psine in sitting x 10 mins     Charges:  Timed Code Treatment Minutes: 33   Total Treatment Minutes: 43     [] EVAL - LOW (25096)   [] EVAL - MOD (06969)  [] EVAL - HIGH (96079)  [] RE-EVAL (20270)  [x] TE (30804) x   1   [] Ionto  [] NMR (97322) x   1   [] Vaso  [x] Manual (12156) x1   [] Ultrasound  [] TA x       [] Mech Traction (02552)  [] Gait Training x     [] ES (un) (44334):   [] Aquatic therapy x   [] Other:   [] Group:     GOALS:   Long term goals  Time Frame for Long term goals : 10 weeks  Long term goal 1: Pt will demo 5/5 strength in B UE for improved ADLs. Grossly met  Long term goal 2: Pt will report pain decrease to 2/10 at worst for improved activity tolerance. Goal  met  Long term goal 3: Pt will return to working full duty. Goal met  Long term goal 4: Pt will be report scar is less sensitive to touch and that she is able to sleep on her back without scar discomfort. Goal met  Long term goal 5: Pt will be independent with HEP for improved long term health. Goal met. Progression Towards Functional goals:  [] Patient is progressing as expected towards functional goals listed. [] Progression is slowed due to complexities listed. [] Progression has been slowed due to co-morbidities.   [] Plan just implemented, too soon to assess goals progression  [x] Other: discharged  Persisting Functional Limitations/Impairments:  []Sleeping []Sitting               []Standing []Transfers        []Walking []Kneeling               []Stairs []Squatting / bending   []ADLs []Reaching  [x]Lifting  []Housework  []Driving [x]Job related tasks  [x]Sports/Recreation []Other:        ASSESSMENT::Patient has shoen significant progress with cervical AROM, upper quarter posture and flexibility, as well as scapular/deep cervical strength. She is able to perform ADLS pain-free, but does fatigue with prolonged overhead activities. She has been given a final HEPas well as a free 30 day pass to [facility] to continue her strengthening. Patient is formally discharged from PT at this time. Treatment/Activity Tolerance:  [x] Patient tolerated treatment well [] Patient limited by fatigue  [] Patient limited by pain  [] Patient limited by other medical complications  [] Other:     Prognosis: [x] Good [] Fair  [] Poor    Patient Requires Follow-up: [] Yes  [x] No    PLAN: See eval. PT 1x / week for 10 weeks.    [] Continue per plan of care [] Alter current plan (see comments)  [] Plan of care initiated [] Hold pending MD visit [x] Discharge    Electronically signed by:     No Waterman PT #[id number]

## 2020-03-20 ENCOUNTER — HOSPITAL ENCOUNTER (EMERGENCY)
Age: 25
Discharge: HOME | End: 2020-03-20
Attending: EMERGENCY MEDICINE
Payer: COMMERCIAL

## 2020-03-20 ENCOUNTER — APPOINTMENT (OUTPATIENT)
Dept: GENERAL RADIOLOGY | Age: 25
End: 2020-03-20
Payer: COMMERCIAL

## 2020-03-20 VITALS
OXYGEN SATURATION: 97 % | RESPIRATION RATE: 18 BRPM | HEIGHT: 60 IN | SYSTOLIC BLOOD PRESSURE: 113 MMHG | BODY MASS INDEX: 21.6 KG/M2 | WEIGHT: 110 LBS | DIASTOLIC BLOOD PRESSURE: 53 MMHG | TEMPERATURE: 98.1 F | HEART RATE: 82 BPM

## 2020-03-20 LAB
A/G RATIO: 2 (ref 1.1–2.2)
ALBUMIN SERPL-MCNC: 4.8 G/DL (ref 3.4–5)
ALP BLD-CCNC: 49 U/L (ref 40–129)
ALT SERPL-CCNC: 12 U/L (ref 10–40)
ANION GAP SERPL CALCULATED.3IONS-SCNC: 14 MMOL/L (ref 3–16)
AST SERPL-CCNC: 22 U/L (ref 15–37)
BASOPHILS ABSOLUTE: 0.1 K/UL (ref 0–0.2)
BASOPHILS RELATIVE PERCENT: 0.7 %
BILIRUB SERPL-MCNC: 0.3 MG/DL (ref 0–1)
BUN BLDV-MCNC: 9 MG/DL (ref 7–20)
CALCIUM SERPL-MCNC: 9.5 MG/DL (ref 8.3–10.6)
CHLORIDE BLD-SCNC: 102 MMOL/L (ref 99–110)
CO2: 23 MMOL/L (ref 21–32)
CREAT SERPL-MCNC: <0.5 MG/DL (ref 0.6–1.1)
D DIMER: <200 NG/ML DDU (ref 0–229)
EOSINOPHILS ABSOLUTE: 0 K/UL (ref 0–0.6)
EOSINOPHILS RELATIVE PERCENT: 0.2 %
GFR AFRICAN AMERICAN: >60
GFR NON-AFRICAN AMERICAN: >60
GLOBULIN: 2.4 G/DL
GLUCOSE BLD-MCNC: 95 MG/DL (ref 70–99)
HCG QUALITATIVE: NEGATIVE
HCT VFR BLD CALC: 39.1 % (ref 36–48)
HEMOGLOBIN: 13.4 G/DL (ref 12–16)
LIPASE: 18 U/L (ref 13–60)
LYMPHOCYTES ABSOLUTE: 1.4 K/UL (ref 1–5.1)
LYMPHOCYTES RELATIVE PERCENT: 18.6 %
MCH RBC QN AUTO: 33.9 PG (ref 26–34)
MCHC RBC AUTO-ENTMCNC: 34.4 G/DL (ref 31–36)
MCV RBC AUTO: 98.6 FL (ref 80–100)
MONOCYTES ABSOLUTE: 0.4 K/UL (ref 0–1.3)
MONOCYTES RELATIVE PERCENT: 5.6 %
NEUTROPHILS ABSOLUTE: 5.7 K/UL (ref 1.7–7.7)
NEUTROPHILS RELATIVE PERCENT: 74.9 %
PDW BLD-RTO: 12.7 % (ref 12.4–15.4)
PLATELET # BLD: 313 K/UL (ref 135–450)
PMV BLD AUTO: 7.6 FL (ref 5–10.5)
POTASSIUM REFLEX MAGNESIUM: 3.8 MMOL/L (ref 3.5–5.1)
RBC # BLD: 3.96 M/UL (ref 4–5.2)
SODIUM BLD-SCNC: 139 MMOL/L (ref 136–145)
TOTAL PROTEIN: 7.2 G/DL (ref 6.4–8.2)
TROPONIN: <0.01 NG/ML
WBC # BLD: 7.7 K/UL (ref 4–11)

## 2020-03-20 PROCEDURE — 85379 FIBRIN DEGRADATION QUANT: CPT

## 2020-03-20 PROCEDURE — 84443 ASSAY THYROID STIM HORMONE: CPT

## 2020-03-20 PROCEDURE — 84703 CHORIONIC GONADOTROPIN ASSAY: CPT

## 2020-03-20 PROCEDURE — 6370000000 HC RX 637 (ALT 250 FOR IP): Performed by: PHYSICIAN ASSISTANT

## 2020-03-20 PROCEDURE — 36415 COLL VENOUS BLD VENIPUNCTURE: CPT

## 2020-03-20 PROCEDURE — 93005 ELECTROCARDIOGRAM TRACING: CPT | Performed by: PHYSICIAN ASSISTANT

## 2020-03-20 PROCEDURE — 83690 ASSAY OF LIPASE: CPT

## 2020-03-20 PROCEDURE — 84484 ASSAY OF TROPONIN QUANT: CPT

## 2020-03-20 PROCEDURE — 80053 COMPREHEN METABOLIC PANEL: CPT

## 2020-03-20 PROCEDURE — 85025 COMPLETE CBC W/AUTO DIFF WBC: CPT

## 2020-03-20 PROCEDURE — 99283 EMERGENCY DEPT VISIT LOW MDM: CPT

## 2020-03-20 PROCEDURE — 71046 X-RAY EXAM CHEST 2 VIEWS: CPT

## 2020-03-20 RX ORDER — HYDROXYZINE PAMOATE 25 MG/1
25-50 CAPSULE ORAL 3 TIMES DAILY PRN
Qty: 30 CAPSULE | Refills: 0 | Status: SHIPPED | OUTPATIENT
Start: 2020-03-20 | End: 2020-04-03

## 2020-03-20 RX ORDER — HYDROXYZINE PAMOATE 25 MG/1
25 CAPSULE ORAL ONCE
Status: COMPLETED | OUTPATIENT
Start: 2020-03-20 | End: 2020-03-20

## 2020-03-20 RX ADMIN — HYDROXYZINE PAMOATE 25 MG: 25 CAPSULE ORAL at 20:53

## 2020-03-20 ASSESSMENT — ENCOUNTER SYMPTOMS
SHORTNESS OF BREATH: 1
STRIDOR: 0
DIARRHEA: 0
COLOR CHANGE: 0
NAUSEA: 0
ABDOMINAL PAIN: 0
CHEST TIGHTNESS: 1
VOMITING: 0
WHEEZING: 0
COUGH: 0
CONSTIPATION: 0
BACK PAIN: 0

## 2020-03-20 ASSESSMENT — HEART SCORE: ECG: 0

## 2020-03-20 ASSESSMENT — PAIN DESCRIPTION - DESCRIPTORS: DESCRIPTORS: DISCOMFORT;HEAVINESS

## 2020-03-20 ASSESSMENT — PAIN SCALES - GENERAL: PAINLEVEL_OUTOF10: 6

## 2020-03-20 ASSESSMENT — PAIN DESCRIPTION - LOCATION: LOCATION: GENERALIZED

## 2020-03-21 LAB
EKG ATRIAL RATE: 73 BPM
EKG DIAGNOSIS: NORMAL
EKG P AXIS: 72 DEGREES
EKG P-R INTERVAL: 130 MS
EKG Q-T INTERVAL: 406 MS
EKG QRS DURATION: 84 MS
EKG QTC CALCULATION (BAZETT): 447 MS
EKG R AXIS: 80 DEGREES
EKG T AXIS: 49 DEGREES
EKG VENTRICULAR RATE: 73 BPM
TSH REFLEX: 2.11 UIU/ML (ref 0.27–4.2)

## 2020-03-21 PROCEDURE — 93010 ELECTROCARDIOGRAM REPORT: CPT | Performed by: INTERNAL MEDICINE
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
## 2020-04-10 ENCOUNTER — APPOINTMENT (OUTPATIENT)
Dept: CT IMAGING | Age: 25
End: 2020-04-10
Payer: COMMERCIAL

## 2020-04-10 ENCOUNTER — NURSE TRIAGE (OUTPATIENT)
Dept: OTHER | Facility: CLINIC | Age: 25
End: 2020-04-10

## 2020-04-10 ENCOUNTER — APPOINTMENT (OUTPATIENT)
Dept: GENERAL RADIOLOGY | Age: 25
End: 2020-04-10
Payer: COMMERCIAL

## 2020-04-10 ENCOUNTER — HOSPITAL ENCOUNTER (EMERGENCY)
Age: 25
Discharge: HOME | End: 2020-04-10
Attending: EMERGENCY MEDICINE
Payer: COMMERCIAL

## 2020-04-10 VITALS
RESPIRATION RATE: 14 BRPM | HEART RATE: 68 BPM | DIASTOLIC BLOOD PRESSURE: 61 MMHG | SYSTOLIC BLOOD PRESSURE: 112 MMHG | TEMPERATURE: 99 F | OXYGEN SATURATION: 99 %

## 2020-04-10 LAB
A/G RATIO: 1.8 (ref 1.1–2.2)
ALBUMIN SERPL-MCNC: 4.8 G/DL (ref 3.4–5)
ALP BLD-CCNC: 42 U/L (ref 40–129)
ALT SERPL-CCNC: 9 U/L (ref 10–40)
ANION GAP SERPL CALCULATED.3IONS-SCNC: 12 MMOL/L (ref 3–16)
AST SERPL-CCNC: 16 U/L (ref 15–37)
BASOPHILS ABSOLUTE: 0 K/UL (ref 0–0.2)
BASOPHILS RELATIVE PERCENT: 0.4 %
BILIRUB SERPL-MCNC: 0.5 MG/DL (ref 0–1)
BUN BLDV-MCNC: 7 MG/DL (ref 7–20)
CALCIUM SERPL-MCNC: 10.1 MG/DL (ref 8.3–10.6)
CHLORIDE BLD-SCNC: 103 MMOL/L (ref 99–110)
CO2: 24 MMOL/L (ref 21–32)
CREAT SERPL-MCNC: <0.5 MG/DL (ref 0.6–1.1)
EOSINOPHILS ABSOLUTE: 0 K/UL (ref 0–0.6)
EOSINOPHILS RELATIVE PERCENT: 0.2 %
GFR AFRICAN AMERICAN: >60
GFR NON-AFRICAN AMERICAN: >60
GLOBULIN: 2.7 G/DL
GLUCOSE BLD-MCNC: 107 MG/DL (ref 70–99)
HCG QUALITATIVE: NEGATIVE
HCT VFR BLD CALC: 37.9 % (ref 36–48)
HEMOGLOBIN: 13.1 G/DL (ref 12–16)
LYMPHOCYTES ABSOLUTE: 1.2 K/UL (ref 1–5.1)
LYMPHOCYTES RELATIVE PERCENT: 18.7 %
MCH RBC QN AUTO: 34.7 PG (ref 26–34)
MCHC RBC AUTO-ENTMCNC: 34.6 G/DL (ref 31–36)
MCV RBC AUTO: 100.2 FL (ref 80–100)
MONOCYTES ABSOLUTE: 0.5 K/UL (ref 0–1.3)
MONOCYTES RELATIVE PERCENT: 7.1 %
NEUTROPHILS ABSOLUTE: 4.9 K/UL (ref 1.7–7.7)
NEUTROPHILS RELATIVE PERCENT: 73.6 %
PDW BLD-RTO: 13.1 % (ref 12.4–15.4)
PLATELET # BLD: 281 K/UL (ref 135–450)
PMV BLD AUTO: 7.6 FL (ref 5–10.5)
POTASSIUM SERPL-SCNC: 4 MMOL/L (ref 3.5–5.1)
RBC # BLD: 3.78 M/UL (ref 4–5.2)
SODIUM BLD-SCNC: 139 MMOL/L (ref 136–145)
TOTAL PROTEIN: 7.5 G/DL (ref 6.4–8.2)
TROPONIN: <0.01 NG/ML
WBC # BLD: 6.7 K/UL (ref 4–11)

## 2020-04-10 PROCEDURE — 99284 EMERGENCY DEPT VISIT MOD MDM: CPT

## 2020-04-10 PROCEDURE — 6360000004 HC RX CONTRAST MEDICATION: Performed by: PHYSICIAN ASSISTANT

## 2020-04-10 PROCEDURE — 84484 ASSAY OF TROPONIN QUANT: CPT

## 2020-04-10 PROCEDURE — 80053 COMPREHEN METABOLIC PANEL: CPT

## 2020-04-10 PROCEDURE — 85025 COMPLETE CBC W/AUTO DIFF WBC: CPT

## 2020-04-10 PROCEDURE — 71045 X-RAY EXAM CHEST 1 VIEW: CPT

## 2020-04-10 PROCEDURE — 93005 ELECTROCARDIOGRAM TRACING: CPT | Performed by: PHYSICIAN ASSISTANT

## 2020-04-10 PROCEDURE — 71260 CT THORAX DX C+: CPT

## 2020-04-10 PROCEDURE — 84703 CHORIONIC GONADOTROPIN ASSAY: CPT

## 2020-04-10 RX ORDER — DOXYCYCLINE HYCLATE 100 MG
100 TABLET ORAL 2 TIMES DAILY
Qty: 20 TABLET | Refills: 0 | Status: SHIPPED | OUTPATIENT
Start: 2020-04-10 | End: 2020-04-20

## 2020-04-10 RX ORDER — HYDROXYZINE PAMOATE 25 MG/1
25 CAPSULE ORAL 3 TIMES DAILY PRN
Qty: 10 CAPSULE | Refills: 0 | Status: SHIPPED | OUTPATIENT
Start: 2020-04-10 | End: 2021-01-30

## 2020-04-10 RX ADMIN — IOPAMIDOL 75 ML: 755 INJECTION, SOLUTION INTRAVENOUS at 22:18

## 2020-04-10 ASSESSMENT — ENCOUNTER SYMPTOMS
EYE ITCHING: 0
SORE THROAT: 0
NAUSEA: 0
EYE REDNESS: 0
EYE PAIN: 0
ABDOMINAL PAIN: 0
BACK PAIN: 0
DIARRHEA: 0
PHOTOPHOBIA: 0
CONSTIPATION: 0
VOICE CHANGE: 0
STRIDOR: 0
ABDOMINAL DISTENTION: 0
WHEEZING: 0
EYE DISCHARGE: 0
COUGH: 1
TROUBLE SWALLOWING: 0
COLOR CHANGE: 0
VOMITING: 0
SHORTNESS OF BREATH: 1

## 2020-04-10 ASSESSMENT — PAIN DESCRIPTION - PAIN TYPE: TYPE: ACUTE PAIN

## 2020-04-10 ASSESSMENT — PAIN DESCRIPTION - LOCATION: LOCATION: BACK

## 2020-04-10 ASSESSMENT — PAIN SCALES - GENERAL: PAINLEVEL_OUTOF10: 9

## 2020-04-11 ENCOUNTER — CARE COORDINATION (OUTPATIENT)
Dept: CARE COORDINATION | Age: 25
End: 2020-04-11

## 2020-04-11 LAB
EKG ATRIAL RATE: 67 BPM
EKG DIAGNOSIS: NORMAL
EKG P AXIS: 39 DEGREES
EKG P-R INTERVAL: 138 MS
EKG Q-T INTERVAL: 396 MS
EKG QRS DURATION: 82 MS
EKG QTC CALCULATION (BAZETT): 418 MS
EKG R AXIS: 72 DEGREES
EKG T AXIS: 43 DEGREES
EKG VENTRICULAR RATE: 67 BPM

## 2020-04-11 PROCEDURE — 93010 ELECTROCARDIOGRAM REPORT: CPT | Performed by: INTERNAL MEDICINE

## 2020-04-11 NOTE — ED PROVIDER NOTES
Social History     Tobacco Use    Smoking status: Never Smoker    Smokeless tobacco: Never Used   Substance Use Topics    Alcohol use: No    Drug use: No       SCREENINGS             PHYSICAL EXAM    (up to 7 for level 4, 8 or more for level 5)     ED Triage Vitals [04/10/20 2000]   BP Temp Temp src Pulse Resp SpO2 Height Weight   117/81 99 °F (37.2 °C) -- 92 16 98 % -- --       Physical Exam  Vitals signs and nursing note reviewed. Constitutional:       Appearance: Normal appearance. She is well-developed. She is not toxic-appearing or diaphoretic. HENT:      Head: Normocephalic and atraumatic. Right Ear: Tympanic membrane, ear canal and external ear normal.      Left Ear: Tympanic membrane, ear canal and external ear normal.      Nose: Congestion present. Mouth/Throat:      Mouth: Mucous membranes are moist.      Pharynx: Oropharynx is clear. Eyes:      General: No scleral icterus. Right eye: No discharge. Left eye: No discharge. Extraocular Movements: Extraocular movements intact. Conjunctiva/sclera: Conjunctivae normal.      Pupils: Pupils are equal, round, and reactive to light. Neck:      Musculoskeletal: Full passive range of motion without pain, normal range of motion and neck supple. Trachea: Trachea and phonation normal.      Meningeal: Brudzinski's sign and Kernig's sign absent. Cardiovascular:      Rate and Rhythm: Normal rate. Pulmonary:      Effort: Pulmonary effort is normal.      Breath sounds: Normal breath sounds. Abdominal:      General: Bowel sounds are normal. There is no distension. Palpations: Abdomen is soft. Tenderness: There is no abdominal tenderness. Musculoskeletal: Normal range of motion. Comments: No extremity edema, posterior calf or thigh tenderness, palpable cord, discoloration. Negative homans. Skin:     General: Skin is warm and dry.       Capillary Refill: Capillary refill takes less than 2 the radiologistEliecer Pang radiographic images are visualized and preliminarily interpreted by the ED Provider with the below findings:        Interpretation per the Radiologist below, if available at the time of this note:    CT Chest Pulmonary Embolism W Contrast   Final Result   No evidence of pulmonary embolism or acute pulmonary abnormality. XR CHEST PORTABLE   Final Result   No acute abnormality      If cough continues, consider CT exam               PROCEDURES   Unless otherwise noted below, none     Procedures    CRITICAL CARE TIME   N/A    CONSULTS:  None      EMERGENCY DEPARTMENT COURSE and DIFFERENTIAL DIAGNOSIS/MDM:   Vitals:    Vitals:    04/10/20 2229 04/10/20 2240 04/10/20 2300 04/10/20 2320   BP: 124/69 121/68 128/66 112/61   Pulse:  67 67 68   Resp:  17 14 14   Temp:       SpO2:  99% 98% 99%       Patient was given the following medications:  Medications   iopamidol (ISOVUE-370) 76 % injection 75 mL (75 mLs Intravenous Given 4/10/20 0188)       This patient presents complaining of cough and shortness of breath with chest pain. She reports trace hemoptysis therefore we did scan her chest to rule out PE or pneumonia or other acute intrathoracic abnormality. CT scan is unremarkable. Blood work is unremarkable. She has had symptoms for several weeks without worsening vitals or CT evidence of ARDS, COVID, or pneumonia.   My suspicion is therefore low for pneumonia, pneumothorax, COVID, influenza, strep throat, otitis infection, dental abscess, Charlie angina, trench mouth, DTs, sepsis, dka, hypoglycemia, overdose, ACS, PE, myocarditis, pericarditis, endocarditis, acute pulmonary edema, pleural effusion, pericardial effusion, cardiac tamponade, cardiomyopathy, CHF exacerbation, thoracic aortic dissection, esophageal rupture, other life-threatening arrhythmia, hypertensive urgency or emergency, hemothorax, pulmonary contusion, subcutaneous emphysema, flail chest, pneumo mediastinum, rib fracture or

## 2020-06-04 ENCOUNTER — HOSPITAL ENCOUNTER (EMERGENCY)
Age: 25
Discharge: HOME | End: 2020-06-04
Attending: STUDENT IN AN ORGANIZED HEALTH CARE EDUCATION/TRAINING PROGRAM
Payer: COMMERCIAL

## 2020-06-04 ENCOUNTER — APPOINTMENT (OUTPATIENT)
Dept: GENERAL RADIOLOGY | Age: 25
End: 2020-06-04
Payer: COMMERCIAL

## 2020-06-04 VITALS
OXYGEN SATURATION: 98 % | WEIGHT: 112 LBS | SYSTOLIC BLOOD PRESSURE: 117 MMHG | BODY MASS INDEX: 21.99 KG/M2 | TEMPERATURE: 98.5 F | HEART RATE: 78 BPM | HEIGHT: 60 IN | RESPIRATION RATE: 13 BRPM | DIASTOLIC BLOOD PRESSURE: 76 MMHG

## 2020-06-04 LAB
ANION GAP SERPL CALCULATED.3IONS-SCNC: 10 MMOL/L (ref 3–16)
BASOPHILS ABSOLUTE: 0 K/UL (ref 0–0.2)
BASOPHILS RELATIVE PERCENT: 0.6 %
BUN BLDV-MCNC: 7 MG/DL (ref 7–20)
CALCIUM SERPL-MCNC: 9.5 MG/DL (ref 8.3–10.6)
CHLORIDE BLD-SCNC: 105 MMOL/L (ref 99–110)
CO2: 23 MMOL/L (ref 21–32)
CREAT SERPL-MCNC: 0.6 MG/DL (ref 0.6–1.1)
EOSINOPHILS ABSOLUTE: 0.1 K/UL (ref 0–0.6)
EOSINOPHILS RELATIVE PERCENT: 1.4 %
GFR AFRICAN AMERICAN: >60
GFR NON-AFRICAN AMERICAN: >60
GLUCOSE BLD-MCNC: 86 MG/DL (ref 70–99)
HCG QUALITATIVE: NEGATIVE
HCT VFR BLD CALC: 39.9 % (ref 36–48)
HEMOGLOBIN: 13.7 G/DL (ref 12–16)
LYMPHOCYTES ABSOLUTE: 1.8 K/UL (ref 1–5.1)
LYMPHOCYTES RELATIVE PERCENT: 26 %
MCH RBC QN AUTO: 34.7 PG (ref 26–34)
MCHC RBC AUTO-ENTMCNC: 34.2 G/DL (ref 31–36)
MCV RBC AUTO: 101.3 FL (ref 80–100)
MONOCYTES ABSOLUTE: 0.7 K/UL (ref 0–1.3)
MONOCYTES RELATIVE PERCENT: 9.4 %
NEUTROPHILS ABSOLUTE: 4.4 K/UL (ref 1.7–7.7)
NEUTROPHILS RELATIVE PERCENT: 62.6 %
PDW BLD-RTO: 13.1 % (ref 12.4–15.4)
PLATELET # BLD: 288 K/UL (ref 135–450)
PMV BLD AUTO: 7.3 FL (ref 5–10.5)
POTASSIUM SERPL-SCNC: 3.7 MMOL/L (ref 3.5–5.1)
PRO-BNP: 15 PG/ML (ref 0–124)
RBC # BLD: 3.94 M/UL (ref 4–5.2)
SODIUM BLD-SCNC: 138 MMOL/L (ref 136–145)
WBC # BLD: 7.1 K/UL (ref 4–11)

## 2020-06-04 PROCEDURE — 93005 ELECTROCARDIOGRAM TRACING: CPT | Performed by: STUDENT IN AN ORGANIZED HEALTH CARE EDUCATION/TRAINING PROGRAM

## 2020-06-04 PROCEDURE — 85025 COMPLETE CBC W/AUTO DIFF WBC: CPT

## 2020-06-04 PROCEDURE — 99285 EMERGENCY DEPT VISIT HI MDM: CPT

## 2020-06-04 PROCEDURE — 71046 X-RAY EXAM CHEST 2 VIEWS: CPT

## 2020-06-04 PROCEDURE — 80048 BASIC METABOLIC PNL TOTAL CA: CPT

## 2020-06-04 PROCEDURE — 83880 ASSAY OF NATRIURETIC PEPTIDE: CPT

## 2020-06-04 PROCEDURE — 84703 CHORIONIC GONADOTROPIN ASSAY: CPT

## 2020-06-04 ASSESSMENT — PAIN DESCRIPTION - PAIN TYPE: TYPE: ACUTE PAIN

## 2020-06-04 ASSESSMENT — PAIN DESCRIPTION - LOCATION: LOCATION: CHEST

## 2020-06-04 ASSESSMENT — PAIN SCALES - GENERAL: PAINLEVEL_OUTOF10: 3

## 2020-06-04 NOTE — ED PROVIDER NOTES
the patient, Cary Washington) to return to the ED or call her PCP if her pain/symptoms worsen. -Findings and recommendations explained to patient. She expressed understanding and agreed with the plan. Don Nye MD (electronically signed)  6/4/2020  _________________________________________________________________________________________  _________________________________________________________________________________________  This record is transcribed utilizing voice recognition technology. There are inherent limitations in this technology. In addition, there may be limitations in editing of this report. If there are any discrepancies, please contact me directly.         Don Nye MD  06/04/20 5738

## 2020-06-05 ENCOUNTER — CARE COORDINATION (OUTPATIENT)
Dept: FAMILY MEDICINE CLINIC | Age: 25
End: 2020-06-05

## 2020-06-05 LAB
EKG ATRIAL RATE: 64 BPM
EKG DIAGNOSIS: NORMAL
EKG P AXIS: 57 DEGREES
EKG P-R INTERVAL: 130 MS
EKG Q-T INTERVAL: 386 MS
EKG QRS DURATION: 82 MS
EKG QTC CALCULATION (BAZETT): 398 MS
EKG R AXIS: 80 DEGREES
EKG T AXIS: 59 DEGREES
EKG VENTRICULAR RATE: 64 BPM

## 2020-06-05 PROCEDURE — 93010 ELECTROCARDIOGRAM REPORT: CPT | Performed by: INTERNAL MEDICINE

## 2020-06-08 ENCOUNTER — CARE COORDINATION (OUTPATIENT)
Dept: FAMILY MEDICINE CLINIC | Age: 25
End: 2020-06-08

## 2020-06-08 NOTE — CARE COORDINATION
ACM placed call to patient to follow up on recent ED visit. Second attempt to reach after leaving vm message.   Unable to contact patient    Leni Paredes RN, MSN  Ambulatory Care Manager  [phone]

## 2021-01-30 ENCOUNTER — HOSPITAL ENCOUNTER (EMERGENCY)
Age: 26
Discharge: HOME | End: 2021-01-30
Payer: COMMERCIAL

## 2021-01-30 VITALS
SYSTOLIC BLOOD PRESSURE: 109 MMHG | HEART RATE: 84 BPM | BODY MASS INDEX: 21.34 KG/M2 | OXYGEN SATURATION: 98 % | WEIGHT: 113 LBS | TEMPERATURE: 98.2 F | DIASTOLIC BLOOD PRESSURE: 63 MMHG | RESPIRATION RATE: 16 BRPM | HEIGHT: 61 IN

## 2021-01-30 DIAGNOSIS — R21 RASH OF FACE: Primary | ICD-10-CM

## 2021-01-30 PROCEDURE — 6370000000 HC RX 637 (ALT 250 FOR IP): Performed by: PHYSICIAN ASSISTANT

## 2021-01-30 PROCEDURE — 99283 EMERGENCY DEPT VISIT LOW MDM: CPT

## 2021-01-30 RX ORDER — PREDNISONE 20 MG/1
60 TABLET ORAL ONCE
Status: COMPLETED | OUTPATIENT
Start: 2021-01-30 | End: 2021-01-30

## 2021-01-30 RX ORDER — DIPHENHYDRAMINE HCL 25 MG
25 CAPSULE ORAL EVERY 4 HOURS PRN
Qty: 25 CAPSULE | Refills: 0 | Status: SHIPPED | OUTPATIENT
Start: 2021-01-30 | End: 2021-02-09

## 2021-01-30 RX ORDER — PREDNISONE 10 MG/1
60 TABLET ORAL DAILY
Qty: 30 TABLET | Refills: 0 | Status: SHIPPED | OUTPATIENT
Start: 2021-01-30 | End: 2021-02-04

## 2021-01-30 RX ORDER — DIPHENHYDRAMINE HCL 25 MG
25 TABLET ORAL ONCE
Status: COMPLETED | OUTPATIENT
Start: 2021-01-30 | End: 2021-01-30

## 2021-01-30 RX ADMIN — DIPHENHYDRAMINE HCL 25 MG: 25 TABLET ORAL at 17:52

## 2021-01-30 RX ADMIN — PREDNISONE 60 MG: 20 TABLET ORAL at 17:52

## 2021-01-30 ASSESSMENT — ENCOUNTER SYMPTOMS
CHEST TIGHTNESS: 0
ABDOMINAL PAIN: 0
COLOR CHANGE: 0
SHORTNESS OF BREATH: 0
VOMITING: 0
BACK PAIN: 0
CONSTIPATION: 0
DIARRHEA: 0
COUGH: 0
NAUSEA: 0
RESPIRATORY NEGATIVE: 1

## 2021-01-30 NOTE — ED PROVIDER NOTES
[address] [facility]        Pt Name: Teagan Barrios  MRN: [id number]  [place] 1995  Date of evaluation: 1/30/2021  Provider: KENA Jones  PCP: Regina Brambila MD    LAURITA. I have evaluated this patient. My supervising physician was available for consultation. CHIEF COMPLAINT       Chief Complaint   Patient presents with    Allergic Reaction     pt woke up in morning with swollen eye, now moving towards,nose and mouth. Pt states possible from acne wipes used last night        HISTORY OF PRESENT ILLNESS   (Location, Timing/Onset, Context/Setting, Quality, Duration, Modifying Factors, Severity, Associated Signs and Symptoms)  Note limiting factors. Teagan Barrios is a 23 y.o. female with past medical she of asthma and bipolar disorder who presents to the ED with complaint of a possible allergic reaction. Patient's last night before she went to bed she used some old acne make-up wipes. Patient states she has used in the past and has not had any problems with them. Patient states she knows they are old so she does not know if potentially could have made them bad. Patient states she woke up this morning with some irritation and almost feeling of burning to her face and when she looked in the mirror she knows that she had swelling surrounding her eyes into her cheeks where she use the wipes. Patient states she did take a Zyrtec and states throughout the day the swelling did improve but is not completely gone at this time. Patient became concerned and came to the ED for further evaluation and treatment. Denies any lip swelling or mouth involvement. Denies any drooling, trismus, stridor or respiratory stress. Denies changes in phonation. Denies fever chills. Denies any other rashes or lesions throughout. Denies any other new contacts.     Nursing Notes were all reviewed and agreed with or any disagreements were addressed in the HPI.    REVIEW OF SYSTEMS    (2-9 systems for level 4, 10 or more for level 5)     Review of Systems   Constitutional: Negative for activity change, appetite change, chills, diaphoresis, fatigue and fever. Respiratory: Negative. Negative for cough, chest tightness and shortness of breath. Cardiovascular: Negative. Negative for chest pain, palpitations and leg swelling. Gastrointestinal: Negative for abdominal pain, constipation, diarrhea, nausea and vomiting. Genitourinary: Negative for decreased urine volume, difficulty urinating, dysuria, flank pain, frequency, hematuria and urgency. Musculoskeletal: Negative for arthralgias, back pain, myalgias, neck pain and neck stiffness. Skin: Positive for rash. Negative for color change, pallor and wound. Neurological: Negative for dizziness, light-headedness and headaches. Positives and Pertinent negatives as per HPI. Except as noted above in the ROS, all other systems were reviewed and negative. PAST MEDICAL HISTORY     Past Medical History:   Diagnosis Date    Asthma     Bipolar disorder ([facility]     Depression          SURGICAL HISTORY   History reviewed. No pertinent surgical history. CURRENTMEDICATIONS       Previous Medications    ALBUTEROL SULFATE HFA (PROVENTIL HFA) 108 (90 BASE) MCG/ACT INHALER    Inhale 2 puffs into the lungs every 6 hours as needed for Wheezing    FLUTICASONE (FLOVENT HFA) 110 MCG/ACT INHALER    Inhale 1 puff into the lungs 2 times daily. FLUTICASONE (FLOVENT HFA) 220 MCG/ACT INHALER    Inhale 1 puff into the lungs 2 times daily.          ALLERGIES     Lavender oil, Milk-related compounds, and Other    FAMILYHISTORY       Family History   Problem Relation Age of Onset    Cancer Mother     Heart Disease Maternal Grandmother     Cancer Maternal Grandmother           SOCIAL HISTORY       Social History     Tobacco Use    Smoking status: Never Smoker    Smokeless tobacco: Never Used   Substance Use Topics    Alcohol use: No    Drug use: Yes     Types: Marijuana       SCREENINGS             PHYSICAL EXAM    (up to 7 for level 4, 8 or more for level 5)     ED Triage Vitals [01/30/21 1728]   BP Temp Temp Source Pulse Resp SpO2 Height Weight   109/63 98.2 °F (36.8 °C) Oral 84 16 98 % 5' 0.5\" (1.537 m) 113 lb (51.3 kg)       Physical Exam  Constitutional:       General: She is not in acute distress. Appearance: Normal appearance. She is well-developed. She is not ill-appearing, toxic-appearing or diaphoretic. HENT:      Head: Normocephalic and atraumatic. Comments: To the face to the periorbital eyes into the cheeks bilaterally there appears to be some rotation and erythema. There is some slight edema. No ecchymoses or warmth. No blistering. No skin breakdown. No bleeding or drainage. Right Ear: External ear normal.      Left Ear: External ear normal.      Mouth/Throat:      Mouth: Mucous membranes are moist.      Pharynx: No oropharyngeal exudate or posterior oropharyngeal erythema. Comments: Posterior pharynx unremarkable. No drooling, trismus, stridor or respiratory stress noted. No changes in phonation. No lip or tongue swelling noted. Eyes:      General:         Right eye: No discharge. Left eye: No discharge. Conjunctiva/sclera: Conjunctivae normal.   Neck:      Musculoskeletal: Normal range of motion and neck supple. Cardiovascular:      Rate and Rhythm: Normal rate and regular rhythm. Pulses: Normal pulses. Heart sounds: Normal heart sounds. No murmur. No friction rub. No gallop. Pulmonary:      Effort: Pulmonary effort is normal. No respiratory distress. Breath sounds: Normal breath sounds. No stridor. No wheezing, rhonchi or rales. Chest:      Chest wall: No tenderness. Musculoskeletal: Normal range of motion. Skin:     General: Skin is warm and dry. Coloration: Skin is not pale. Findings: No erythema.    Neurological:      Mental Status: She is alert and oriented to person, place, and time. Psychiatric:         Behavior: Behavior normal.         DIAGNOSTIC RESULTS   LABS:    Labs Reviewed - No data to display    All other labs were within normal range or not returned as of this dictation. EKG: All EKG's are interpreted by the Emergency Department Physician in the absence of a cardiologist.  Please see their note for interpretation of EKG. RADIOLOGY:   Non-plain film images such as CT, Ultrasound and MRI are read by the radiologist. Plain radiographic images are visualized and preliminarily interpreted by the ED Provider with the below findings:        Interpretation per the Radiologist below, if available at the time of this note:    No orders to display     No results found. PROCEDURES   Unless otherwise noted below, none     Procedures    CRITICAL CARE TIME   N/A    CONSULTS:  None      EMERGENCY DEPARTMENT COURSE and DIFFERENTIAL DIAGNOSIS/MDM:   Vitals:    Vitals:    01/30/21 1728   BP: 109/63   Pulse: 84   Resp: 16   Temp: 98.2 °F (36.8 °C)   TempSrc: Oral   SpO2: 98%   Weight: 113 lb (51.3 kg)   Height: 5' 0.5\" (1.537 m)       Patient was given the following medications:  Medications   predniSONE (DELTASONE) tablet 60 mg (60 mg Oral Given 1/30/21 1752)   diphenhydrAMINE (BENADRYL) tablet 25 mg (25 mg Oral Given 1/30/21 1752)           Patient is a 53-year-old female who presents to the ED with complaint of a rash to the face. Patient states last night before she went to bed she used some acne make-up wipes. Patient that she woke up today with swelling to her eyes. Does have pictures. Did take a Zyrtec afterwards. Pictures appear to show significant swelling with comparison to presentation today. Still has some residual swelling to the periorbital eyes with some irritation to the forehead, periorbital eyes and cheeks. Does not involve the mouth.   Symptoms have been present since she woke up this morning greater than 10 hours with improvement of symptoms with Zyrtec at home. Do not believe further observation. Indicated given patient is already documented improvement which is Zyrtec. No respiratory distress and do not believe further work-up indicated emergently at this time. Likely some for rash of the face which may potentially be due to some mild allergic reaction but could also be due to mild chemical irritation. Patient be given Benadryl to help with some of the swelling and will be given prednisone to help as well. Follow-up with PCP. Return to ED for worsening symptoms. Low suspicion for SJS, HSP, TEN, scabies, bedbugs, varicella, milia, tinea, erysipelas, scalded skin syndrome, meningococcemia, occult bacteremia, necrotizing fasciitis, folliculitis, cellulitis, abscess, angioedema, anaphylaxis or other emergent etiology at this time. FINAL IMPRESSION      1. Rash of face          DISPOSITION/PLAN   DISPOSITION Decision To Discharge 01/30/2021 05:48:05 PM      PATIENT REFERREDTO:  Kirstie Gonzáles MD  [address] [facility]  [phone]    Schedule an appointment as soon as possible for a visit   For a Re-check in  3-5    days.     [facility] Emergency Department  [address] [place] [address]  [phone]  Go to   As needed, If symptoms worsen      DISCHARGE MEDICATIONS:  New Prescriptions    DIPHENHYDRAMINE (BENADRYL) 25 MG CAPSULE    Take 1 capsule by mouth every 4 hours as needed for Itching    PREDNISONE (DELTASONE) 10 MG TABLET    Take 6 tablets by mouth daily for 5 doses       DISCONTINUED MEDICATIONS:  Discontinued Medications    HYDROXYZINE (VISTARIL) 25 MG CAPSULE    Take 1 capsule by mouth 3 times daily as needed for Anxiety              (Please note that portions of this note were completed with a voice recognition program.  Efforts were made to edit the dictations but occasionally words are mis-transcribed.)    KENA Muller (electronically signed)          KENA Ceballos  01/30/21 5125

## 2021-01-30 NOTE — ED NOTES
Bed: 30  Expected date:   Expected time:   Means of arrival: Walk In  Comments:     Piedad Hampton [facility]  01/30/21 2000

## 2021-08-24 ENCOUNTER — OFFICE VISIT (OUTPATIENT)
Dept: FAMILY MEDICINE CLINIC | Age: 26
End: 2021-08-24
Payer: COMMERCIAL

## 2021-08-24 VITALS
HEIGHT: 60 IN | WEIGHT: 116 LBS | TEMPERATURE: 98.4 F | SYSTOLIC BLOOD PRESSURE: 110 MMHG | HEART RATE: 78 BPM | BODY MASS INDEX: 22.78 KG/M2 | RESPIRATION RATE: 18 BRPM | OXYGEN SATURATION: 98 % | DIASTOLIC BLOOD PRESSURE: 70 MMHG

## 2021-08-24 DIAGNOSIS — L72.3 SEBACEOUS CYST: Primary | ICD-10-CM

## 2021-08-24 DIAGNOSIS — L91.8 SKIN TAG: ICD-10-CM

## 2021-08-24 PROCEDURE — 99213 OFFICE O/P EST LOW 20 MIN: CPT | Performed by: FAMILY MEDICINE

## 2021-08-24 ASSESSMENT — PATIENT HEALTH QUESTIONNAIRE - PHQ9
SUM OF ALL RESPONSES TO PHQ9 QUESTIONS 1 & 2: 0
1. LITTLE INTEREST OR PLEASURE IN DOING THINGS: 0
SUM OF ALL RESPONSES TO PHQ QUESTIONS 1-9: 0
2. FEELING DOWN, DEPRESSED OR HOPELESS: 0
SUM OF ALL RESPONSES TO PHQ QUESTIONS 1-9: 0
SUM OF ALL RESPONSES TO PHQ QUESTIONS 1-9: 0

## 2021-08-25 ASSESSMENT — ENCOUNTER SYMPTOMS
DIARRHEA: 0
SHORTNESS OF BREATH: 0
COUGH: 0
ABDOMINAL PAIN: 0
ROS SKIN COMMENTS: SKIN LESION
CONSTIPATION: 0
CHEST TIGHTNESS: 0

## 2021-08-25 NOTE — PROGRESS NOTES
SUBJECTIVE:  Izabella Martin   1995   female   Allergies   Allergen Reactions    Lavender Oil Anaphylaxis     trouble breathing and swelling    Milk-Related Compounds Shortness Of Breath    Other Anaphylaxis     trouble breathing and swelling       Chief Complaint   Patient presents with    Skin Problem        Patient Active Problem List    Diagnosis Date Noted    Suicide attempt by multiple drug overdose ([facility] 08/11/2013    Overdose of antidepressant 08/11/2013    Asthma 08/11/2013       HPI   Patient is here with a couple concerns including a \"knot\" behind left calf and a mole on the left side of neck. Patient reports that she was rubbing her left calf a couple of days ago when she noticed the knot. No history of any injuries. No redness or swelling. No pain. No fever or chills. She is also noticed the mole on the left side of her neck last week. No personal or family history of skin disease/cancer. Past Medical History:   Diagnosis Date    Asthma     Bipolar disorder ([facility]     Depression      Social History     Socioeconomic History    Marital status: Single     Spouse name: Not on file    Number of children: Not on file    Years of education: Not on file    Highest education level: Not on file   Occupational History    Not on file   Tobacco Use    Smoking status: Never Smoker    Smokeless tobacco: Never Used   Vaping Use    Vaping Use: Never used   Substance and Sexual Activity    Alcohol use: No    Drug use: Yes     Types: Marijuana    Sexual activity: Not on file   Other Topics Concern    Not on file   Social History Narrative    ** Merged History Encounter **          Social Determinants of Health     Financial Resource Strain:     Difficulty of Paying Living Expenses:    Food Insecurity:     Worried About Running Out of Food in the Last Year:     [address] in the Last Year:    Transportation Needs:     Lack of Transportation (Medical):      Lack of Transportation (Non-Medical):    Physical Activity:     Days of Exercise per Week:     Minutes of Exercise per Session:    Stress:     Feeling of Stress :    Social Connections:     Frequency of Communication with Friends and Family:     Frequency of Social Gatherings with Friends and Family:     Attends [employer] Services:     Active Member of Clubs or Organizations:     Attends Club or Organization Meetings:     Marital Status:    Intimate Partner Violence:     Fear of Current or Ex-Partner:     Emotionally Abused:     Physically Abused:     Sexually Abused:      Family History   Problem Relation Age of Onset    Cancer Mother     Heart Disease Maternal Grandmother     Cancer Maternal Grandmother        Review of Systems   Constitutional: Negative for activity change, appetite change and unexpected weight change. Respiratory: Negative for cough, chest tightness and shortness of breath. Cardiovascular: Negative for chest pain, palpitations and leg swelling. Gastrointestinal: Negative for abdominal pain, constipation and diarrhea. Musculoskeletal: Negative for arthralgias and myalgias. Skin: Negative for rash. Skin lesion   Neurological: Negative for light-headedness and headaches. Hematological: Negative for adenopathy. Does not bruise/bleed easily. Psychiatric/Behavioral: Negative for dysphoric mood and suicidal ideas. The patient is not nervous/anxious. OBJECTIVE:  /70   Pulse 78   Temp 98.4 °F (36.9 °C)   Resp 18   Ht 5' (1.524 m)   Wt 116 lb (52.6 kg)   LMP 08/23/2021   SpO2 98%   BMI 22.65 kg/m²   Physical Exam  Vitals and nursing note reviewed. Constitutional:       Appearance: She is well-developed. Neck:      Thyroid: No thyromegaly. Vascular: No JVD. Cardiovascular:      Rate and Rhythm: Normal rate and regular rhythm. Pulmonary:      Effort: Pulmonary effort is normal.      Breath sounds: Normal breath sounds.    Abdominal: General: Bowel sounds are normal.      Palpations: Abdomen is soft. Tenderness: There is no abdominal tenderness. Musculoskeletal:      Cervical back: Normal range of motion and neck supple. Skin:     Findings: No rash. Comments: There is some 1 to 2 mm, small, round lesion in left posterior/mid calf. Otherwise no swelling or erythema or tenderness or cords      There is 1 to 2 mm,/round, raised slightly hyperpigmented lesion with a small scab at the posterior and on the left neck. Neurological:      Mental Status: She is alert and oriented to person, place, and time. Psychiatric:         Behavior: Behavior normal.         Thought Content: Thought content normal.         ASSESSMENT/PLAN:    1. Sebaceous cyst versus small calcified cyst  Patient will monitor for now for any changes. She will let us know if the area enlarges or its erythematous or painful    2. Skin tag? We will monitor for now for any changes as increasing in size or color    No orders of the defined types were placed in this encounter. Current Outpatient Medications   Medication Sig Dispense Refill    albuterol sulfate HFA (PROVENTIL HFA) 108 (90 BASE) MCG/ACT inhaler Inhale 2 puffs into the lungs every 6 hours as needed for Wheezing 1 Inhaler 1    fluticasone (FLOVENT HFA) 110 MCG/ACT inhaler Inhale 1 puff into the lungs 2 times daily. 1 Inhaler 1    fluticasone (FLOVENT HFA) 220 MCG/ACT inhaler Inhale 1 puff into the lungs 2 times daily. No current facility-administered medications for this visit. Return if symptoms worsen or fail to improve.     Monique Russell MD, MD

## 2023-11-17 ENCOUNTER — OFFICE VISIT (OUTPATIENT)
Age: 28
End: 2023-11-17

## 2023-11-17 VITALS
BODY MASS INDEX: 23.24 KG/M2 | HEART RATE: 70 BPM | WEIGHT: 119 LBS | TEMPERATURE: 98.4 F | DIASTOLIC BLOOD PRESSURE: 68 MMHG | SYSTOLIC BLOOD PRESSURE: 109 MMHG | OXYGEN SATURATION: 97 %

## 2023-11-17 DIAGNOSIS — T14.8XXA PUNCTURE WOUND: Primary | ICD-10-CM

## 2023-11-17 ASSESSMENT — ENCOUNTER SYMPTOMS
NAUSEA: 0
TROUBLE SWALLOWING: 0
ABDOMINAL PAIN: 0
VOICE CHANGE: 0
VOMITING: 0
SORE THROAT: 0

## 2024-11-17 ENCOUNTER — HOSPITAL ENCOUNTER (EMERGENCY)
Age: 29
Discharge: HOME | End: 2024-11-17
Attending: STUDENT IN AN ORGANIZED HEALTH CARE EDUCATION/TRAINING PROGRAM
Payer: COMMERCIAL

## 2024-11-17 VITALS
HEIGHT: 60 IN | DIASTOLIC BLOOD PRESSURE: 78 MMHG | OXYGEN SATURATION: 99 % | TEMPERATURE: 98.5 F | BODY MASS INDEX: 22.19 KG/M2 | HEART RATE: 70 BPM | WEIGHT: 113 LBS | SYSTOLIC BLOOD PRESSURE: 123 MMHG | RESPIRATION RATE: 20 BRPM

## 2024-11-17 DIAGNOSIS — K13.70 ORAL LESION: Primary | ICD-10-CM

## 2024-11-17 PROCEDURE — 99283 EMERGENCY DEPT VISIT LOW MDM: CPT

## 2024-11-17 RX ORDER — VALACYCLOVIR HYDROCHLORIDE 1 G/1
1000 TABLET, FILM COATED ORAL 3 TIMES DAILY
Qty: 21 TABLET | Refills: 0 | Status: SHIPPED | OUTPATIENT
Start: 2024-11-17 | End: 2024-11-24

## 2024-11-17 RX ORDER — VALACYCLOVIR HYDROCHLORIDE 1 G/1
1000 TABLET, FILM COATED ORAL 3 TIMES DAILY
Qty: 21 TABLET | Refills: 0 | Status: SHIPPED | OUTPATIENT
Start: 2024-11-17 | End: 2024-11-17

## 2024-11-17 ASSESSMENT — LIFESTYLE VARIABLES
HOW MANY STANDARD DRINKS CONTAINING ALCOHOL DO YOU HAVE ON A TYPICAL DAY: 1 OR 2
HOW OFTEN DO YOU HAVE A DRINK CONTAINING ALCOHOL: MONTHLY OR LESS

## 2024-11-17 ASSESSMENT — PAIN - FUNCTIONAL ASSESSMENT: PAIN_FUNCTIONAL_ASSESSMENT: 0-10

## 2024-11-17 NOTE — ED PROVIDER NOTES
[facility] EMERGENCY DEPARTMENT  EMERGENCY DEPARTMENT ENCOUNTER        Pt Name: Diane Holden  MRN: [id number]  Birthdate 1995  Date of evaluation: 11/17/2024  Provider: KENA Weiner  PCP: No primary care provider on file.  Note Started: 3:06 AM EST 11/17/24       I have seen and evaluated this patient with my supervising physician Chung Alvarado DO.      CHIEF COMPLAINT       Chief Complaint   Patient presents with    Rash     Pt states she started having ear pain a few weeks ago and now has a rash on her cheek that is spreading to her neck.        HISTORY OF PRESENT ILLNESS: 1 or more Elements     History From: Patient  Limitations to history : None    Diane Holden is a 28 y.o. female with past medical Struve asthma and bipolar disorder who presents ED with complaint of a rash.  She reports she was having pain to her bilateral ears a few weeks ago.  Today she noticed rash to the inside of her right cheek along her gumline.  She denies any pain.  States she feels like her cheek is numb.  Denies any external rash.  Denies drooling, trismus, stridor or respiratory distress.  Denies injury or trauma.  Denies fever or chills.  Denies any new contacts including foods, cosmetics, lotions, clothing, detergents or ingestions.    Nursing Notes were all reviewed and agreed with or any disagreements were addressed in the HPI.    REVIEW OF SYSTEMS :      Review of Systems   Constitutional:  Negative for activity change, appetite change, chills, diaphoresis, fatigue and fever.   Respiratory: Negative.  Negative for cough and shortness of breath.    Cardiovascular: Negative.  Negative for chest pain.   Gastrointestinal:  Negative for abdominal pain, constipation, diarrhea, nausea and vomiting.   Genitourinary:  Negative for decreased urine volume, difficulty urinating, dysuria, flank pain, frequency, hematuria and urgency.   Musculoskeletal:  Negative for arthralgias, back pain, myalgias, 
ear pain a few weeks ago and now has a rash on her cheek that is spreading to her neck. ) as described above.  History obtained from patient and LAURITA.  Prior medical history reviewed.  Initial vital signs reviewed and within normal limits.  Patient nontoxic appearing and stable.  Physical exam as above.    History and physical exam consistent with possible shingles.  The rash appears dermatomal as well as vesicles on erythematous base.  Unclear what other etiology would fit this.  Patient also with a history of shingles in the past.  Will treat patient with valacyclovir and Magic mouthwash.  Encourage patient to follow-up with her primary care provider.  Patient verbalized understanding and agreement with plan for outpatient management.    During the patient's ED course, the patient was given:  Medications - No data to display    Social determinants of health:   None applicable    Chronic conditions potentially affecting care:   depression/anxiety        FINAL IMPRESSION     1. Oral lesion          DISPOSITION/PLAN     DISPOSITION Decision To Discharge 11/17/2024 03:00:31 AM     Blood pressure 123/78, pulse 70, temperature 98.5 °F (36.9 °C), temperature source Oral, resp. rate 20, height 1.524 m (5'), weight 51.3 kg (113 lb), SpO2 99%, not currently breastfeeding.    PATIENT REFERRALS  [facility] Emergency Department  [address] [address]  [phone]  Go to   As needed, If symptoms worsen    [facility]  [address] [address]  [phone]        DISCHARGE MEDICATIONS  Discharge Medication List as of 11/17/2024  3:09 AM        DISCONTINUED MEDICATIONS  Discharge Medication List as of 11/17/2024  3:09 AM            Note electronically signed by:   Chung Alvarado DO  Attending emergency physician    This chart was generated using the Dragon dictation system.  I created this record, but it may contain

## 2025-04-02 LAB
CHLAMYDIA TRACHOMATIS AMPLIFIED DET: NOT DETECTED
HB: SOURCE: NORMAL
N GONORRHOEAE AMPLIFIED DET: NOT DETECTED
SPECIMEN TYPE: NORMAL

## 2025-04-08 ENCOUNTER — OFFICE VISIT (OUTPATIENT)
Dept: PRIMARY CARE CLINIC | Age: 30
End: 2025-04-08
Payer: COMMERCIAL

## 2025-04-08 VITALS
WEIGHT: 118 LBS | BODY MASS INDEX: 23.16 KG/M2 | HEART RATE: 64 BPM | RESPIRATION RATE: 16 BRPM | SYSTOLIC BLOOD PRESSURE: 90 MMHG | DIASTOLIC BLOOD PRESSURE: 60 MMHG | OXYGEN SATURATION: 98 % | HEIGHT: 60 IN

## 2025-04-08 DIAGNOSIS — M54.2 NECK PAIN: ICD-10-CM

## 2025-04-08 DIAGNOSIS — Z11.59 NEED FOR HEPATITIS C SCREENING TEST: ICD-10-CM

## 2025-04-08 DIAGNOSIS — Z78.9 VARICELLA VACCINATION STATUS UNKNOWN: ICD-10-CM

## 2025-04-08 DIAGNOSIS — Z11.4 ENCOUNTER FOR SCREENING FOR HIV: ICD-10-CM

## 2025-04-08 DIAGNOSIS — Z00.00 ANNUAL PHYSICAL EXAM: ICD-10-CM

## 2025-04-08 DIAGNOSIS — F98.8 ATTENTION DEFICIT DISORDER, UNSPECIFIED TYPE: ICD-10-CM

## 2025-04-08 DIAGNOSIS — R10.13 EPIGASTRIC ABDOMINAL PAIN: ICD-10-CM

## 2025-04-08 DIAGNOSIS — F41.9 ANXIETY: ICD-10-CM

## 2025-04-08 DIAGNOSIS — Z76.89 ENCOUNTER TO ESTABLISH CARE: Primary | ICD-10-CM

## 2025-04-08 LAB
BASOPHILS # BLD: 0 K/UL (ref 0–0.2)
BASOPHILS NFR BLD: 0.6 %
DEPRECATED RDW RBC AUTO: 12.4 % (ref 12.4–15.4)
EOSINOPHIL # BLD: 0.1 K/UL (ref 0–0.6)
EOSINOPHIL NFR BLD: 1.6 %
HCT VFR BLD AUTO: 37.8 % (ref 36–48)
HGB BLD-MCNC: 12.9 G/DL (ref 12–16)
LYMPHOCYTES # BLD: 1.6 K/UL (ref 1–5.1)
LYMPHOCYTES NFR BLD: 35 %
MCH RBC QN AUTO: 33.8 PG (ref 26–34)
MCHC RBC AUTO-ENTMCNC: 34 G/DL (ref 31–36)
MCV RBC AUTO: 99.5 FL (ref 80–100)
MONOCYTES # BLD: 0.4 K/UL (ref 0–1.3)
MONOCYTES NFR BLD: 7.8 %
NEUTROPHILS # BLD: 2.5 K/UL (ref 1.7–7.7)
NEUTROPHILS NFR BLD: 55 %
PLATELET # BLD AUTO: 293 K/UL (ref 135–450)
PMV BLD AUTO: 8.2 FL (ref 5–10.5)
RBC # BLD AUTO: 3.8 M/UL (ref 4–5.2)
WBC # BLD AUTO: 4.6 K/UL (ref 4–11)

## 2025-04-08 PROCEDURE — 99385 PREV VISIT NEW AGE 18-39: CPT

## 2025-04-08 PROCEDURE — 36415 COLL VENOUS BLD VENIPUNCTURE: CPT

## 2025-04-08 RX ORDER — PANTOPRAZOLE SODIUM 40 MG/1
40 TABLET, DELAYED RELEASE ORAL
Qty: 90 TABLET | Refills: 0 | Status: SHIPPED | OUTPATIENT
Start: 2025-04-08

## 2025-04-08 SDOH — ECONOMIC STABILITY: FOOD INSECURITY: WITHIN THE PAST 12 MONTHS, THE FOOD YOU BOUGHT JUST DIDN'T LAST AND YOU DIDN'T HAVE MONEY TO GET MORE.: SOMETIMES TRUE

## 2025-04-08 SDOH — ECONOMIC STABILITY: FOOD INSECURITY: WITHIN THE PAST 12 MONTHS, YOU WORRIED THAT YOUR FOOD WOULD RUN OUT BEFORE YOU GOT MONEY TO BUY MORE.: SOMETIMES TRUE

## 2025-04-08 ASSESSMENT — ENCOUNTER SYMPTOMS
WHEEZING: 0
COLOR CHANGE: 0
SHORTNESS OF BREATH: 0
CHEST TIGHTNESS: 0
ABDOMINAL PAIN: 1
COUGH: 0
VOMITING: 0
CONSTIPATION: 0
DIARRHEA: 0
BACK PAIN: 0
BLOOD IN STOOL: 0
NAUSEA: 0

## 2025-04-08 ASSESSMENT — PATIENT HEALTH QUESTIONNAIRE - PHQ9
SUM OF ALL RESPONSES TO PHQ QUESTIONS 1-9: 17
5. POOR APPETITE OR OVEREATING: NEARLY EVERY DAY
4. FEELING TIRED OR HAVING LITTLE ENERGY: MORE THAN HALF THE DAYS
9. THOUGHTS THAT YOU WOULD BE BETTER OFF DEAD, OR OF HURTING YOURSELF: NOT AT ALL
SUM OF ALL RESPONSES TO PHQ QUESTIONS 1-9: 17
2. FEELING DOWN, DEPRESSED OR HOPELESS: MORE THAN HALF THE DAYS
6. FEELING BAD ABOUT YOURSELF - OR THAT YOU ARE A FAILURE OR HAVE LET YOURSELF OR YOUR FAMILY DOWN: NOT AT ALL
3. TROUBLE FALLING OR STAYING ASLEEP: MORE THAN HALF THE DAYS
10. IF YOU CHECKED OFF ANY PROBLEMS, HOW DIFFICULT HAVE THESE PROBLEMS MADE IT FOR YOU TO DO YOUR WORK, TAKE CARE OF THINGS AT HOME, OR GET ALONG WITH OTHER PEOPLE: VERY DIFFICULT
8. MOVING OR SPEAKING SO SLOWLY THAT OTHER PEOPLE COULD HAVE NOTICED. OR THE OPPOSITE, BEING SO FIGETY OR RESTLESS THAT YOU HAVE BEEN MOVING AROUND A LOT MORE THAN USUAL: MORE THAN HALF THE DAYS
1. LITTLE INTEREST OR PLEASURE IN DOING THINGS: NEARLY EVERY DAY
7. TROUBLE CONCENTRATING ON THINGS, SUCH AS READING THE NEWSPAPER OR WATCHING TELEVISION: NEARLY EVERY DAY
SUM OF ALL RESPONSES TO PHQ QUESTIONS 1-9: 17
SUM OF ALL RESPONSES TO PHQ QUESTIONS 1-9: 17

## 2025-04-08 ASSESSMENT — ANXIETY QUESTIONNAIRES
2. NOT BEING ABLE TO STOP OR CONTROL WORRYING: NEARLY EVERY DAY
GAD7 TOTAL SCORE: 18
6. BECOMING EASILY ANNOYED OR IRRITABLE: SEVERAL DAYS
5. BEING SO RESTLESS THAT IT IS HARD TO SIT STILL: NEARLY EVERY DAY
7. FEELING AFRAID AS IF SOMETHING AWFUL MIGHT HAPPEN: MORE THAN HALF THE DAYS
4. TROUBLE RELAXING: NEARLY EVERY DAY
IF YOU CHECKED OFF ANY PROBLEMS ON THIS QUESTIONNAIRE, HOW DIFFICULT HAVE THESE PROBLEMS MADE IT FOR YOU TO DO YOUR WORK, TAKE CARE OF THINGS AT HOME, OR GET ALONG WITH OTHER PEOPLE: VERY DIFFICULT
1. FEELING NERVOUS, ANXIOUS, OR ON EDGE: NEARLY EVERY DAY
3. WORRYING TOO MUCH ABOUT DIFFERENT THINGS: NEARLY EVERY DAY

## 2025-04-08 NOTE — ASSESSMENT & PLAN NOTE
Will obtain MRI c-spine - f/u with result. Consider specialist referral pending result or conservative treatment with repeated physical therapy, steroid, muscle relaxer.

## 2025-04-08 NOTE — ASSESSMENT & PLAN NOTE
Possible ulcer? Will rx Protonix 40mg daily for now, patient to f/u with GI for further eval and probable EGD.
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
## 2025-04-09 LAB
ALBUMIN SERPL-MCNC: 4.5 G/DL (ref 3.4–5)
ALBUMIN/GLOB SERPL: 2 {RATIO} (ref 1.1–2.2)
ALP SERPL-CCNC: 39 U/L (ref 40–129)
ALT SERPL-CCNC: 11 U/L (ref 10–40)
ANION GAP SERPL CALCULATED.3IONS-SCNC: 10 MMOL/L (ref 3–16)
AST SERPL-CCNC: 15 U/L (ref 15–37)
BILIRUB SERPL-MCNC: <0.2 MG/DL (ref 0–1)
BUN SERPL-MCNC: 9 MG/DL (ref 7–20)
CALCIUM SERPL-MCNC: 9 MG/DL (ref 8.3–10.6)
CHLORIDE SERPL-SCNC: 107 MMOL/L (ref 99–110)
CHOLEST SERPL-MCNC: 148 MG/DL (ref 0–199)
CO2 SERPL-SCNC: 22 MMOL/L (ref 21–32)
CREAT SERPL-MCNC: 0.6 MG/DL (ref 0.6–1.1)
GFR SERPLBLD CREATININE-BSD FMLA CKD-EPI: >90 ML/MIN/{1.73_M2}
GLUCOSE SERPL-MCNC: 93 MG/DL (ref 70–99)
HCV AB SERPL QL IA: NORMAL
HDLC SERPL-MCNC: 74 MG/DL (ref 40–60)
HIV 1+2 AB+HIV1 P24 AG SERPL QL IA: NORMAL
HIV 2 AB SERPL QL IA: NORMAL
HIV1 AB SERPL QL IA: NORMAL
HIV1 P24 AG SERPL QL IA: NORMAL
LDL CHOLESTEROL: 64 MG/DL
POTASSIUM SERPL-SCNC: 4.2 MMOL/L (ref 3.5–5.1)
PROT SERPL-MCNC: 6.7 G/DL (ref 6.4–8.2)
SODIUM SERPL-SCNC: 139 MMOL/L (ref 136–145)
TRIGL SERPL-MCNC: 50 MG/DL (ref 0–150)
TSH SERPL DL<=0.005 MIU/L-ACNC: 0.67 UIU/ML (ref 0.27–4.2)
VLDLC SERPL CALC-MCNC: 10 MG/DL
VZV IGG SER QL IA: NORMAL

## 2025-04-10 ENCOUNTER — RESULTS FOLLOW-UP (OUTPATIENT)
Dept: PRIMARY CARE CLINIC | Age: 30
End: 2025-04-10